# Patient Record
Sex: MALE | Race: WHITE | NOT HISPANIC OR LATINO | URBAN - METROPOLITAN AREA
[De-identification: names, ages, dates, MRNs, and addresses within clinical notes are randomized per-mention and may not be internally consistent; named-entity substitution may affect disease eponyms.]

---

## 2023-04-18 ENCOUNTER — PATIENT OUTREACH (OUTPATIENT)
Dept: CARE COORDINATION | Facility: CLINIC | Age: 28
End: 2023-04-18
Payer: COMMERCIAL

## 2023-04-19 LAB
ALANINE AMINOTRANSFERASE (SGPT) (U/L) IN SER/PLAS: 775 U/L (ref 10–52)
ALBUMIN (G/DL) IN SER/PLAS: 4.5 G/DL (ref 3.4–5)
ALKALINE PHOSPHATASE (U/L) IN SER/PLAS: 102 U/L (ref 33–120)
ANION GAP IN SER/PLAS: 15 MMOL/L (ref 10–20)
ASPARTATE AMINOTRANSFERASE (SGOT) (U/L) IN SER/PLAS: 904 U/L (ref 9–39)
BILIRUBIN TOTAL (MG/DL) IN SER/PLAS: 0.6 MG/DL (ref 0–1.2)
CALCIUM (MG/DL) IN SER/PLAS: 9.6 MG/DL (ref 8.6–10.6)
CARBON DIOXIDE, TOTAL (MMOL/L) IN SER/PLAS: 29 MMOL/L (ref 21–32)
CHLORIDE (MMOL/L) IN SER/PLAS: 97 MMOL/L (ref 98–107)
CREATINE KINASE (U/L) IN SER/PLAS: ABNORMAL U/L (ref 0–325)
CREATININE (MG/DL) IN SER/PLAS: 0.98 MG/DL (ref 0.5–1.3)
GFR MALE: >90 ML/MIN/1.73M2
GLUCOSE (MG/DL) IN SER/PLAS: 82 MG/DL (ref 74–99)
POTASSIUM (MMOL/L) IN SER/PLAS: 4.4 MMOL/L (ref 3.5–5.3)
PROTEIN TOTAL: 7.5 G/DL (ref 6.4–8.2)
SODIUM (MMOL/L) IN SER/PLAS: 137 MMOL/L (ref 136–145)
UREA NITROGEN (MG/DL) IN SER/PLAS: 22 MG/DL (ref 6–23)

## 2023-04-21 LAB
ALANINE AMINOTRANSFERASE (SGPT) (U/L) IN SER/PLAS: 579 U/L (ref 10–52)
ALBUMIN (G/DL) IN SER/PLAS: 4.4 G/DL (ref 3.4–5)
ALKALINE PHOSPHATASE (U/L) IN SER/PLAS: 94 U/L (ref 33–120)
ANION GAP IN SER/PLAS: 11 MMOL/L (ref 10–20)
ASPARTATE AMINOTRANSFERASE (SGOT) (U/L) IN SER/PLAS: 438 U/L (ref 9–39)
BILIRUBIN TOTAL (MG/DL) IN SER/PLAS: 0.5 MG/DL (ref 0–1.2)
CALCIUM (MG/DL) IN SER/PLAS: 9.2 MG/DL (ref 8.6–10.6)
CARBON DIOXIDE, TOTAL (MMOL/L) IN SER/PLAS: 28 MMOL/L (ref 21–32)
CHLORIDE (MMOL/L) IN SER/PLAS: 101 MMOL/L (ref 98–107)
CREATINE KINASE (U/L) IN SER/PLAS: 5303 U/L (ref 0–325)
CREATININE (MG/DL) IN SER/PLAS: 0.76 MG/DL (ref 0.5–1.3)
GFR MALE: >90 ML/MIN/1.73M2
GLUCOSE (MG/DL) IN SER/PLAS: 75 MG/DL (ref 74–99)
POTASSIUM (MMOL/L) IN SER/PLAS: 3.8 MMOL/L (ref 3.5–5.3)
PROTEIN TOTAL: 7.2 G/DL (ref 6.4–8.2)
SODIUM (MMOL/L) IN SER/PLAS: 136 MMOL/L (ref 136–145)
UREA NITROGEN (MG/DL) IN SER/PLAS: 26 MG/DL (ref 6–23)

## 2023-04-24 LAB
ALANINE AMINOTRANSFERASE (SGPT) (U/L) IN SER/PLAS: 325 U/L (ref 10–52)
ALBUMIN (G/DL) IN SER/PLAS: 4.5 G/DL (ref 3.4–5)
ALKALINE PHOSPHATASE (U/L) IN SER/PLAS: 79 U/L (ref 33–120)
ANION GAP IN SER/PLAS: 13 MMOL/L (ref 10–20)
APPEARANCE, URINE: CLEAR
ASPARTATE AMINOTRANSFERASE (SGOT) (U/L) IN SER/PLAS: 112 U/L (ref 9–39)
BILIRUBIN TOTAL (MG/DL) IN SER/PLAS: 0.4 MG/DL (ref 0–1.2)
BILIRUBIN, URINE: NEGATIVE
BLOOD, URINE: NEGATIVE
CALCIUM (MG/DL) IN SER/PLAS: 9.8 MG/DL (ref 8.6–10.6)
CARBON DIOXIDE, TOTAL (MMOL/L) IN SER/PLAS: 30 MMOL/L (ref 21–32)
CHLORIDE (MMOL/L) IN SER/PLAS: 101 MMOL/L (ref 98–107)
COLOR, URINE: NORMAL
CREATINE KINASE (U/L) IN SER/PLAS: 1026 U/L (ref 0–325)
CREATININE (MG/DL) IN SER/PLAS: 1.25 MG/DL (ref 0.5–1.3)
GFR MALE: 82 ML/MIN/1.73M2
GLUCOSE (MG/DL) IN SER/PLAS: 83 MG/DL (ref 74–99)
GLUCOSE, URINE: NEGATIVE MG/DL
KETONES, URINE: NEGATIVE MG/DL
LEUKOCYTE ESTERASE, URINE: NEGATIVE
NITRITE, URINE: NEGATIVE
PH, URINE: 6 (ref 5–8)
POTASSIUM (MMOL/L) IN SER/PLAS: 4.9 MMOL/L (ref 3.5–5.3)
PROTEIN TOTAL: 7.6 G/DL (ref 6.4–8.2)
PROTEIN, URINE: NEGATIVE MG/DL
SODIUM (MMOL/L) IN SER/PLAS: 139 MMOL/L (ref 136–145)
SPECIFIC GRAVITY, URINE: 1.01 (ref 1–1.03)
UREA NITROGEN (MG/DL) IN SER/PLAS: 29 MG/DL (ref 6–23)
UROBILINOGEN, URINE: <2 MG/DL (ref 0–1.9)

## 2023-04-26 ENCOUNTER — OFFICE VISIT (OUTPATIENT)
Dept: PRIMARY CARE | Facility: CLINIC | Age: 28
End: 2023-04-26
Payer: COMMERCIAL

## 2023-04-26 VITALS
OXYGEN SATURATION: 98 % | HEART RATE: 109 BPM | DIASTOLIC BLOOD PRESSURE: 62 MMHG | TEMPERATURE: 96.8 F | SYSTOLIC BLOOD PRESSURE: 108 MMHG | BODY MASS INDEX: 23.14 KG/M2 | HEIGHT: 68 IN | WEIGHT: 152.7 LBS

## 2023-04-26 DIAGNOSIS — M62.82 NON-TRAUMATIC RHABDOMYOLYSIS: Primary | ICD-10-CM

## 2023-04-26 PROCEDURE — 99214 OFFICE O/P EST MOD 30 MIN: CPT | Performed by: STUDENT IN AN ORGANIZED HEALTH CARE EDUCATION/TRAINING PROGRAM

## 2023-04-26 PROCEDURE — 1036F TOBACCO NON-USER: CPT | Performed by: STUDENT IN AN ORGANIZED HEALTH CARE EDUCATION/TRAINING PROGRAM

## 2023-04-26 RX ORDER — CYCLOBENZAPRINE HCL 5 MG
1 TABLET ORAL EVERY 6 HOURS PRN
COMMUNITY
Start: 2023-04-17

## 2023-04-26 ASSESSMENT — ENCOUNTER SYMPTOMS
DEPRESSION: 0
OCCASIONAL FEELINGS OF UNSTEADINESS: 0
LOSS OF SENSATION IN FEET: 0

## 2023-04-26 ASSESSMENT — PATIENT HEALTH QUESTIONNAIRE - PHQ9
1. LITTLE INTEREST OR PLEASURE IN DOING THINGS: NOT AT ALL
2. FEELING DOWN, DEPRESSED OR HOPELESS: NOT AT ALL
SUM OF ALL RESPONSES TO PHQ9 QUESTIONS 1 AND 2: 0

## 2023-04-26 NOTE — PROGRESS NOTES
"Subjective   Reason for Visit: Ramy Cottrell is an 27 y.o. male here for a office visit.          Reviewed all medications by prescribing practitioner or clinical pharmacist (such as prescriptions, OTCs, herbal therapies and supplements) and documented in the medical record.    HPI    Rhambdo: was recently admitted to [facility] for rhabmdo. Ck of 17,000 and elevated lfts. States he was working out a lot and not staying the most hydrated. State he did leg heavy day and ran 15 miles. He started gettign pains, dark urine and knee swelling he was taking to ED and was admitted and treated with IV fluids with improvement in labs,. Feeling well now. He is cleared to go back to work. I explained etiology of his illness and encourage could nutrition and hydration to prevent this in the future    Patient Care Team:  Srinath Joyner DO as PCP - General  Srinath Joyner DO as PCP - Employee ACO PCP  Renetta Rose RN as Care Manager (Case Management)     Review of Systems   All other systems reviewed and are negative.      Objective   Vitals:  Pulse 109   Temp 36 °C (96.8 °F) (Temporal)   Ht 1.727 m (5' 8\")   Wt 69.3 kg (152 lb 11.2 oz)   SpO2 98%   BMI 23.22 kg/m²       Physical Exam  Constitutional:       Appearance: Normal appearance.   HENT:      Head: Normocephalic and atraumatic.      Right Ear: Tympanic membrane and ear canal normal.      Left Ear: Tympanic membrane and ear canal normal.      Mouth/Throat:      Mouth: Mucous membranes are moist.      Pharynx: Oropharynx is clear.   Eyes:      Extraocular Movements: Extraocular movements intact.      Conjunctiva/sclera: Conjunctivae normal.      Pupils: Pupils are equal, round, and reactive to light.   Cardiovascular:      Rate and Rhythm: Normal rate and regular rhythm.      Pulses: Normal pulses.      Heart sounds: Normal heart sounds.   Pulmonary:      Effort: Pulmonary effort is normal.      Breath sounds: Normal breath sounds.   Abdominal:      General: Abdomen " is flat. Bowel sounds are normal.      Palpations: Abdomen is soft.   Musculoskeletal:         General: Normal range of motion.      Cervical back: Normal range of motion and neck supple.   Skin:     General: Skin is warm and dry.      Capillary Refill: Capillary refill takes 2 to 3 seconds.   Neurological:      General: No focal deficit present.      Mental Status: He is alert and oriented to person, place, and time. Mental status is at baseline.   Psychiatric:         Mood and Affect: Mood normal.         Behavior: Behavior normal.         Thought Content: Thought content normal.         Judgment: Judgment normal.         Assessment/Plan     1. Non-traumatic rhabdomyolysis  - hospital reviewed  - lab work reviewed  - cleared to go back to work  - advised good nutrition and ease in to long runs

## 2023-04-26 NOTE — LETTER
April 26, 2023     Patient: Ramy Cottrell   YOB: 1995   Date of Visit: 4/26/2023       To Whom It May Concern:    Ramy Cottrell was seen in my clinic on 4/26/2023 at 11:45 am. He is cleared to go back to work    If you have any questions or concerns, please don't hesitate to call.         Sincerely,         Srinath Joyner DO        CC: No Recipients

## 2023-04-26 NOTE — PROGRESS NOTES
"Subjective   Patient ID: Ramy Cottrell is a 27 y.o. male who presents for Results and Hospital Follow-up (syncope).    HPI     Review of Systems    Objective   /62 (BP Location: Right arm, Patient Position: Sitting)   Pulse 109   Temp 36 °C (96.8 °F) (Temporal)   Ht 1.727 m (5' 8\")   Wt 69.3 kg (152 lb 11.2 oz)   SpO2 98%   BMI 23.22 kg/m²     Physical Exam    Assessment/Plan          "

## 2024-04-16 ENCOUNTER — HOSPITAL ENCOUNTER (INPATIENT)
Age: 29
LOS: 6 days | Discharge: HOME | DRG: 558 | End: 2024-04-22
Attending: EMERGENCY MEDICINE | Admitting: FAMILY MEDICINE

## 2024-04-16 DIAGNOSIS — R74.01 TRANSAMINITIS: ICD-10-CM

## 2024-04-16 DIAGNOSIS — M62.82 NON-TRAUMATIC RHABDOMYOLYSIS: Primary | ICD-10-CM

## 2024-04-16 LAB
ALBUMIN SERPL-MCNC: 4.6 G/DL (ref 3.5–5.2)
ALBUMIN/GLOB SERPL: 1.6 {RATIO} (ref 1–2.5)
ALP SERPL-CCNC: 73 U/L (ref 40–129)
ALT SERPL-CCNC: 642 U/L (ref 5–41)
ANION GAP SERPL CALCULATED.3IONS-SCNC: 12 MMOL/L (ref 9–17)
APAP SERPL-MCNC: <10 UG/ML (ref 10–30)
APAP SERPL-MCNC: <5 UG/ML (ref 10–30)
AST SERPL-CCNC: 1916 U/L
BACTERIA URNS QL MICRO: ABNORMAL
BASOPHILS # BLD: 0 K/UL (ref 0–0.2)
BASOPHILS NFR BLD: 0 % (ref 0–2)
BILIRUB SERPL-MCNC: 0.3 MG/DL (ref 0.3–1.2)
BILIRUB UR QL STRIP: NEGATIVE
BUN SERPL-MCNC: 25 MG/DL (ref 6–20)
CALCIUM SERPL-MCNC: 9.6 MG/DL (ref 8.6–10.4)
CHARACTER UR: ABNORMAL
CHLORIDE SERPL-SCNC: 102 MMOL/L (ref 98–107)
CK SERPL-CCNC: ABNORMAL U/L (ref 39–308)
CLARITY UR: CLEAR
CO2 SERPL-SCNC: 26 MMOL/L (ref 20–31)
COLOR UR: ABNORMAL
CREAT SERPL-MCNC: 0.7 MG/DL (ref 0.7–1.2)
EOSINOPHIL # BLD: 0.1 K/UL (ref 0–0.4)
EOSINOPHILS RELATIVE PERCENT: 2 % (ref 1–4)
EPI CELLS #/AREA URNS HPF: ABNORMAL /HPF (ref 0–5)
ERYTHROCYTE [DISTWIDTH] IN BLOOD BY AUTOMATED COUNT: 13.1 % (ref 12.5–15.4)
GFR SERPL CREATININE-BSD FRML MDRD: >90 ML/MIN/1.73M2
GLUCOSE SERPL-MCNC: 123 MG/DL (ref 70–99)
GLUCOSE UR STRIP-MCNC: NEGATIVE MG/DL
HCT VFR BLD AUTO: 40.9 % (ref 41–53)
HGB BLD-MCNC: 13.9 G/DL (ref 13.5–17.5)
HGB UR QL STRIP.AUTO: ABNORMAL
INR PPP: 0.9
KETONES UR STRIP-MCNC: NEGATIVE MG/DL
LEUKOCYTE ESTERASE UR QL STRIP: NEGATIVE
LYMPHOCYTES NFR BLD: 1.3 K/UL (ref 1–4.8)
LYMPHOCYTES RELATIVE PERCENT: 14 % (ref 24–44)
MCH RBC QN AUTO: 28.5 PG (ref 26–34)
MCHC RBC AUTO-ENTMCNC: 34 G/DL (ref 31–37)
MCV RBC AUTO: 83.9 FL (ref 80–100)
MONOCYTES NFR BLD: 0.5 K/UL (ref 0.1–1.2)
MONOCYTES NFR BLD: 5 % (ref 2–11)
NEUTROPHILS NFR BLD: 79 % (ref 36–66)
NEUTS SEG NFR BLD: 7.3 K/UL (ref 1.8–7.7)
NITRITE UR QL STRIP: NEGATIVE
PARTIAL THROMBOPLASTIN TIME: 22.6 SEC (ref 21.3–31.3)
PH UR STRIP: 6.5 [PH] (ref 5–8)
PLATELET # BLD AUTO: 230 K/UL (ref 140–450)
PMV BLD AUTO: 7.7 FL (ref 6–12)
POTASSIUM SERPL-SCNC: 4.3 MMOL/L (ref 3.7–5.3)
PROT SERPL-MCNC: 7.4 G/DL (ref 6.4–8.3)
PROT UR STRIP-MCNC: ABNORMAL MG/DL
PROTHROMBIN TIME: 9.2 SEC (ref 9.4–12.6)
RBC # BLD AUTO: 4.87 M/UL (ref 4.5–5.9)
RBC #/AREA URNS HPF: ABNORMAL /HPF (ref 0–2)
SODIUM SERPL-SCNC: 140 MMOL/L (ref 135–144)
SP GR UR STRIP: 1.02 (ref 1–1.03)
UROBILINOGEN UR STRIP-ACNC: NORMAL EU/DL (ref 0–1)
WBC #/AREA URNS HPF: ABNORMAL /HPF (ref 0–5)
WBC OTHER # BLD: 9.2 K/UL (ref 3.5–11)

## 2024-04-16 PROCEDURE — 82550 ASSAY OF CK (CPK): CPT

## 2024-04-16 PROCEDURE — 81001 URINALYSIS AUTO W/SCOPE: CPT

## 2024-04-16 PROCEDURE — 86038 ANTINUCLEAR ANTIBODIES: CPT

## 2024-04-16 PROCEDURE — 99285 EMERGENCY DEPT VISIT HI MDM: CPT

## 2024-04-16 PROCEDURE — 2580000003 HC RX 258

## 2024-04-16 PROCEDURE — 80143 DRUG ASSAY ACETAMINOPHEN: CPT

## 2024-04-16 PROCEDURE — 85730 THROMBOPLASTIN TIME PARTIAL: CPT

## 2024-04-16 PROCEDURE — 86225 DNA ANTIBODY NATIVE: CPT

## 2024-04-16 PROCEDURE — 96361 HYDRATE IV INFUSION ADD-ON: CPT

## 2024-04-16 PROCEDURE — 85025 COMPLETE CBC W/AUTO DIFF WBC: CPT

## 2024-04-16 PROCEDURE — 80053 COMPREHEN METABOLIC PANEL: CPT

## 2024-04-16 PROCEDURE — 99223 1ST HOSP IP/OBS HIGH 75: CPT | Performed by: STUDENT IN AN ORGANIZED HEALTH CARE EDUCATION/TRAINING PROGRAM

## 2024-04-16 PROCEDURE — 6370000000 HC RX 637 (ALT 250 FOR IP): Performed by: EMERGENCY MEDICINE

## 2024-04-16 PROCEDURE — 96360 HYDRATION IV INFUSION INIT: CPT

## 2024-04-16 PROCEDURE — 36415 COLL VENOUS BLD VENIPUNCTURE: CPT

## 2024-04-16 PROCEDURE — 85610 PROTHROMBIN TIME: CPT

## 2024-04-16 PROCEDURE — 2580000003 HC RX 258: Performed by: REGISTERED NURSE

## 2024-04-16 PROCEDURE — 1200000000 HC SEMI PRIVATE

## 2024-04-16 RX ORDER — ENOXAPARIN SODIUM 100 MG/ML
40 INJECTION SUBCUTANEOUS DAILY
Status: DISCONTINUED | OUTPATIENT
Start: 2024-04-17 | End: 2024-04-20

## 2024-04-16 RX ORDER — ONDANSETRON 4 MG/1
4 TABLET, ORALLY DISINTEGRATING ORAL EVERY 8 HOURS PRN
Status: DISCONTINUED | OUTPATIENT
Start: 2024-04-16 | End: 2024-04-22 | Stop reason: HOSPADM

## 2024-04-16 RX ORDER — ACETAMINOPHEN 325 MG/1
650 TABLET ORAL EVERY 6 HOURS PRN
Status: DISCONTINUED | OUTPATIENT
Start: 2024-04-16 | End: 2024-04-17

## 2024-04-16 RX ORDER — SODIUM CHLORIDE 0.9 % (FLUSH) 0.9 %
10 SYRINGE (ML) INJECTION PRN
Status: DISCONTINUED | OUTPATIENT
Start: 2024-04-16 | End: 2024-04-22 | Stop reason: HOSPADM

## 2024-04-16 RX ORDER — COVID-19 ANTIGEN TEST
220 KIT MISCELLANEOUS AS NEEDED
Status: ON HOLD | COMMUNITY
End: 2024-04-22 | Stop reason: HOSPADM

## 2024-04-16 RX ORDER — POTASSIUM CHLORIDE 7.45 MG/ML
10 INJECTION INTRAVENOUS PRN
Status: DISCONTINUED | OUTPATIENT
Start: 2024-04-16 | End: 2024-04-22 | Stop reason: HOSPADM

## 2024-04-16 RX ORDER — CYCLOBENZAPRINE HCL 10 MG
10 TABLET ORAL ONCE
Status: COMPLETED | OUTPATIENT
Start: 2024-04-16 | End: 2024-04-16

## 2024-04-16 RX ORDER — SODIUM CHLORIDE 9 MG/ML
INJECTION, SOLUTION INTRAVENOUS CONTINUOUS
Status: DISCONTINUED | OUTPATIENT
Start: 2024-04-16 | End: 2024-04-22 | Stop reason: HOSPADM

## 2024-04-16 RX ORDER — ACETAMINOPHEN 650 MG/1
650 SUPPOSITORY RECTAL EVERY 6 HOURS PRN
Status: DISCONTINUED | OUTPATIENT
Start: 2024-04-16 | End: 2024-04-17

## 2024-04-16 RX ORDER — SODIUM CHLORIDE 9 MG/ML
INJECTION, SOLUTION INTRAVENOUS PRN
Status: DISCONTINUED | OUTPATIENT
Start: 2024-04-16 | End: 2024-04-22 | Stop reason: HOSPADM

## 2024-04-16 RX ORDER — ONDANSETRON 2 MG/ML
4 INJECTION INTRAMUSCULAR; INTRAVENOUS EVERY 6 HOURS PRN
Status: DISCONTINUED | OUTPATIENT
Start: 2024-04-16 | End: 2024-04-22 | Stop reason: HOSPADM

## 2024-04-16 RX ORDER — SODIUM CHLORIDE 0.9 % (FLUSH) 0.9 %
5-40 SYRINGE (ML) INJECTION EVERY 12 HOURS SCHEDULED
Status: DISCONTINUED | OUTPATIENT
Start: 2024-04-16 | End: 2024-04-22 | Stop reason: HOSPADM

## 2024-04-16 RX ORDER — POTASSIUM CHLORIDE 20 MEQ/1
40 TABLET, EXTENDED RELEASE ORAL PRN
Status: DISCONTINUED | OUTPATIENT
Start: 2024-04-16 | End: 2024-04-22 | Stop reason: HOSPADM

## 2024-04-16 RX ORDER — MAGNESIUM SULFATE 1 G/100ML
1000 INJECTION INTRAVENOUS PRN
Status: DISCONTINUED | OUTPATIENT
Start: 2024-04-16 | End: 2024-04-22 | Stop reason: HOSPADM

## 2024-04-16 RX ORDER — 0.9 % SODIUM CHLORIDE 0.9 %
1000 INTRAVENOUS SOLUTION INTRAVENOUS ONCE
Status: COMPLETED | OUTPATIENT
Start: 2024-04-16 | End: 2024-04-16

## 2024-04-16 RX ORDER — POLYETHYLENE GLYCOL 3350 17 G/17G
17 POWDER, FOR SOLUTION ORAL DAILY PRN
Status: DISCONTINUED | OUTPATIENT
Start: 2024-04-16 | End: 2024-04-22 | Stop reason: HOSPADM

## 2024-04-16 RX ADMIN — SODIUM CHLORIDE: 9 INJECTION, SOLUTION INTRAVENOUS at 21:29

## 2024-04-16 RX ADMIN — SODIUM CHLORIDE 1000 ML: 9 INJECTION, SOLUTION INTRAVENOUS at 15:44

## 2024-04-16 RX ADMIN — SODIUM CHLORIDE: 9 INJECTION, SOLUTION INTRAVENOUS at 16:31

## 2024-04-16 RX ADMIN — SODIUM CHLORIDE 1000 ML: 9 INJECTION, SOLUTION INTRAVENOUS at 14:42

## 2024-04-16 RX ADMIN — CYCLOBENZAPRINE 10 MG: 10 TABLET, FILM COATED ORAL at 19:35

## 2024-04-16 ASSESSMENT — PAIN DESCRIPTION - LOCATION
LOCATION: LEG
LOCATION: BACK;LEG;BUTTOCKS
LOCATION: GENERALIZED

## 2024-04-16 ASSESSMENT — PAIN SCALES - GENERAL
PAINLEVEL_OUTOF10: 3
PAINLEVEL_OUTOF10: 6
PAINLEVEL_OUTOF10: 8
PAINLEVEL_OUTOF10: 0

## 2024-04-16 ASSESSMENT — PAIN - FUNCTIONAL ASSESSMENT
PAIN_FUNCTIONAL_ASSESSMENT: 0-10
PAIN_FUNCTIONAL_ASSESSMENT: 0-10
PAIN_FUNCTIONAL_ASSESSMENT: ACTIVITIES ARE NOT PREVENTED

## 2024-04-16 ASSESSMENT — PAIN DESCRIPTION - DESCRIPTORS
DESCRIPTORS: ACHING
DESCRIPTORS: ACHING

## 2024-04-16 ASSESSMENT — PAIN DESCRIPTION - PAIN TYPE: TYPE: ACUTE PAIN

## 2024-04-16 ASSESSMENT — PAIN DESCRIPTION - ORIENTATION: ORIENTATION: LEFT;RIGHT

## 2024-04-16 ASSESSMENT — PAIN DESCRIPTION - ONSET: ONSET: ON-GOING

## 2024-04-16 ASSESSMENT — PAIN DESCRIPTION - FREQUENCY: FREQUENCY: CONTINUOUS

## 2024-04-16 NOTE — ED PROVIDER NOTES
[facility]  [address] [facility] [address]  Phone: [phone]      Attending Physician Attestation    I performed a history and physical examination of the patient and discussed management with the mid level provider. I reviewed the mid level provider's note and agree with the documented findings and plan of care. Any areas of disagreement are noted on the chart. I was personally present for the key portions of any procedures. I have documented in the chart those procedures where I was not present during the key portions. I have reviewed the emergency nurses triage note. I agree with the chief complaint, past medical history, past surgical history, allergies, medications, social and family history as documented unless otherwise noted below. Documentation of the HPI, Physical Exam and Medical Decision Making performed by mid level providers is based on my personal performance of the HPI, PE and MDM. For Physician Assistant/ Nurse Practitioner cases/documentation I have personally evaluated this patient and have completed at least one if not all key elements of the E/M (history, physical exam, and MDM). Additional findings are as noted.      CHIEF COMPLAINT       Chief Complaint   Patient presents with    Hematuria        PAST MEDICAL HISTORY     Past Medical History:   Diagnosis Date    Rhabdomyolysis        SURGICAL HISTORY      has no past surgical history on file.    CURRENT MEDICATIONS       Previous Medications    No medications on file       ALLERGIES     Patient has no known allergies.    FAMILY HISTORY       No family status information on file.      History reviewed. No pertinent family history.    SOCIAL HISTORY      reports that he has never smoked. He has never used smokeless tobacco. He reports that he does not currently use alcohol. He reports that he does not use drugs.     VITALS   INITIAL VITALS: BP (!) 152/90   Pulse 93   Temp 98.8 °F (37.1 °C) (Oral)   Resp 18   Ht 1.727

## 2024-04-16 NOTE — ED PROVIDER NOTES
[facility]  [address] [facility] [address]  Phone: [phone]        Pt Name: Benjamin Truong  MRN: [id number]  Birthdate 1995  Date of evaluation: 4/16/24    CHIEFCOMPLAINT       Chief Complaint   Patient presents with    Hematuria       HISTORY OF PRESENT ILLNESS (Location/Symptom, Timing/Onset, Context/Setting, Quality, Duration, Modifying Factors, Severity)      Benjamin Truong is a 28 y.o. male who presents to the ED via private auto with pink-colored urine that started today.  Patient states he has a history of rhabdomyolysis in the past, is concerned that he is in rhabdo again today.  He states he does do ultramarathons, states that he did a long hike last week and has not been able to exercise since.  Does report some muscle stiffness and pain admits that he does not drink enough water as he should.  He denies any fevers, chills, chest pain, shortness of breath or difficulty breathing.  He denies any nausea vomiting or diarrhea.  On arrival he is resting on the cot with even unlabored breaths nontoxic.  No acute distress noted.    PAST MEDICAL / SURGICAL / SOCIAL / FAMILY HISTORY     PMH:  has a past medical history of Rhabdomyolysis.  Surgical History:  has no past surgical history on file.  Social History:  reports that he has never smoked. He has never used smokeless tobacco. He reports that he does not currently use alcohol. He reports that he does not use drugs.  Family History: has no family status information on file.    family history is not on file.  Psychiatric History: None    Allergies: Patient has no known allergies.    Home Medications:   Prior to Admission medications    Not on File       REVIEW OF SYSTEMS  (2-9 systems for level 4, 10 ormore for level 5)      Review of Systems   Constitutional:  Negative for chills and fever.   Respiratory:  Negative for cough and shortness of breath.    Cardiovascular:  Negative for chest pain.   Gastrointestinal:  Negative for abdominal

## 2024-04-16 NOTE — ED NOTES
Pt presents to ED c/o hematuria and bilateral leg pain. Pt reports a hx of rhabdomyolysis and states he thinks he has it again. Pt states he trains very hard with running and lifting and thinks he over-did it recently. Pt arrives A/Ox4, PWD, PMS intact. Pt resting on stretcher with call light in reach.

## 2024-04-17 ENCOUNTER — APPOINTMENT (OUTPATIENT)
Dept: ULTRASOUND IMAGING | Age: 29
DRG: 558 | End: 2024-04-17

## 2024-04-17 PROBLEM — R74.01 TRANSAMINITIS: Status: ACTIVE | Noted: 2024-04-17

## 2024-04-17 LAB
ALBUMIN SERPL-MCNC: 3.5 G/DL (ref 3.5–5.2)
ALP SERPL-CCNC: 62 U/L (ref 40–129)
ALT SERPL-CCNC: 847 U/L (ref 5–41)
ANA SER QL IA: NEGATIVE
ANION GAP SERPL CALCULATED.3IONS-SCNC: 6 MMOL/L (ref 9–17)
AST SERPL-CCNC: 2749 U/L
BASOPHILS # BLD: 0.05 K/UL (ref 0–0.2)
BASOPHILS NFR BLD: 1 % (ref 0–2)
BILIRUB SERPL-MCNC: 0.1 MG/DL (ref 0.3–1.2)
BUN SERPL-MCNC: 17 MG/DL (ref 6–20)
BUN/CREAT SERPL: 24 (ref 9–20)
CALCIUM SERPL-MCNC: 8.3 MG/DL (ref 8.6–10.4)
CHLORIDE SERPL-SCNC: 107 MMOL/L (ref 98–107)
CK SERPL-CCNC: ABNORMAL U/L (ref 39–308)
CO2 SERPL-SCNC: 28 MMOL/L (ref 20–31)
CREAT SERPL-MCNC: 0.7 MG/DL (ref 0.7–1.2)
DSDNA IGG SER QL IA: 1.7 IU/ML
EOSINOPHIL # BLD: 0.33 K/UL (ref 0–0.44)
EOSINOPHILS RELATIVE PERCENT: 5 % (ref 1–4)
ERYTHROCYTE [DISTWIDTH] IN BLOOD BY AUTOMATED COUNT: 12.6 % (ref 11.8–14.4)
GFR SERPL CREATININE-BSD FRML MDRD: >90 ML/MIN/1.73M2
GLUCOSE SERPL-MCNC: 105 MG/DL (ref 70–99)
HAV IGM SERPL QL IA: NONREACTIVE
HBV CORE IGM SERPL QL IA: NONREACTIVE
HBV SURFACE AG SERPL QL IA: NONREACTIVE
HCT VFR BLD AUTO: 40 % (ref 40.7–50.3)
HCV AB SERPL QL IA: NONREACTIVE
HGB BLD-MCNC: 12.5 G/DL (ref 13–17)
IMM GRANULOCYTES # BLD AUTO: 0.02 K/UL (ref 0–0.3)
IMM GRANULOCYTES NFR BLD: 0 %
LIPASE SERPL-CCNC: 38 U/L (ref 13–60)
LYMPHOCYTES NFR BLD: 2.3 K/UL (ref 1.1–3.7)
LYMPHOCYTES RELATIVE PERCENT: 33 % (ref 24–43)
MCH RBC QN AUTO: 28 PG (ref 25.2–33.5)
MCHC RBC AUTO-ENTMCNC: 31.3 G/DL (ref 28.4–34.8)
MCV RBC AUTO: 89.5 FL (ref 82.6–102.9)
MONOCYTES NFR BLD: 0.42 K/UL (ref 0.1–1.2)
MONOCYTES NFR BLD: 6 % (ref 3–12)
MYOGLOBIN SERPL-MCNC: 1632 NG/ML (ref 28–72)
MYOGLOBIN SERPL-MCNC: 1929 NG/ML (ref 28–72)
MYOGLOBIN SERPL-MCNC: 2416 NG/ML (ref 28–72)
NEUTROPHILS NFR BLD: 56 % (ref 36–65)
NEUTS SEG NFR BLD: 3.92 K/UL (ref 1.5–8.1)
NRBC BLD-RTO: 0 PER 100 WBC
NUCLEAR IGG SER IA-RTO: 0.2 U/ML
PLATELET # BLD AUTO: 194 K/UL (ref 138–453)
PMV BLD AUTO: 9.4 FL (ref 8.1–13.5)
POTASSIUM SERPL-SCNC: 4.3 MMOL/L (ref 3.7–5.3)
PROT SERPL-MCNC: 5.8 G/DL (ref 6.4–8.3)
RBC # BLD AUTO: 4.47 M/UL (ref 4.21–5.77)
SODIUM SERPL-SCNC: 141 MMOL/L (ref 135–144)
WBC OTHER # BLD: 7 K/UL (ref 3.5–11.3)

## 2024-04-17 PROCEDURE — 82550 ASSAY OF CK (CPK): CPT

## 2024-04-17 PROCEDURE — 80074 ACUTE HEPATITIS PANEL: CPT

## 2024-04-17 PROCEDURE — 83874 ASSAY OF MYOGLOBIN: CPT

## 2024-04-17 PROCEDURE — 85025 COMPLETE CBC W/AUTO DIFF WBC: CPT

## 2024-04-17 PROCEDURE — 80053 COMPREHEN METABOLIC PANEL: CPT

## 2024-04-17 PROCEDURE — 6370000000 HC RX 637 (ALT 250 FOR IP): Performed by: NURSE PRACTITIONER

## 2024-04-17 PROCEDURE — 99231 SBSQ HOSP IP/OBS SF/LOW 25: CPT | Performed by: NURSE PRACTITIONER

## 2024-04-17 PROCEDURE — 6360000002 HC RX W HCPCS

## 2024-04-17 PROCEDURE — 36415 COLL VENOUS BLD VENIPUNCTURE: CPT

## 2024-04-17 PROCEDURE — 2580000003 HC RX 258

## 2024-04-17 PROCEDURE — 83690 ASSAY OF LIPASE: CPT

## 2024-04-17 PROCEDURE — 1200000000 HC SEMI PRIVATE

## 2024-04-17 PROCEDURE — 76705 ECHO EXAM OF ABDOMEN: CPT

## 2024-04-17 PROCEDURE — 2580000003 HC RX 258: Performed by: NURSE PRACTITIONER

## 2024-04-17 RX ORDER — CYCLOBENZAPRINE HCL 10 MG
10 TABLET ORAL ONCE
Status: COMPLETED | OUTPATIENT
Start: 2024-04-17 | End: 2024-04-17

## 2024-04-17 RX ORDER — CYCLOBENZAPRINE HCL 5 MG
5 TABLET ORAL 3 TIMES DAILY PRN
Status: DISCONTINUED | OUTPATIENT
Start: 2024-04-17 | End: 2024-04-19

## 2024-04-17 RX ORDER — 0.9 % SODIUM CHLORIDE 0.9 %
1000 INTRAVENOUS SOLUTION INTRAVENOUS ONCE
Status: COMPLETED | OUTPATIENT
Start: 2024-04-17 | End: 2024-04-17

## 2024-04-17 RX ADMIN — CYCLOBENZAPRINE HYDROCHLORIDE 10 MG: 10 TABLET, FILM COATED ORAL at 03:46

## 2024-04-17 RX ADMIN — CYCLOBENZAPRINE HYDROCHLORIDE 5 MG: 5 TABLET, FILM COATED ORAL at 11:49

## 2024-04-17 RX ADMIN — CYCLOBENZAPRINE HYDROCHLORIDE 5 MG: 5 TABLET, FILM COATED ORAL at 19:46

## 2024-04-17 RX ADMIN — ENOXAPARIN SODIUM 40 MG: 100 INJECTION SUBCUTANEOUS at 08:18

## 2024-04-17 RX ADMIN — SODIUM CHLORIDE: 9 INJECTION, SOLUTION INTRAVENOUS at 05:24

## 2024-04-17 RX ADMIN — SODIUM CHLORIDE: 9 INJECTION, SOLUTION INTRAVENOUS at 15:00

## 2024-04-17 RX ADMIN — SODIUM CHLORIDE 1000 ML: 9 INJECTION, SOLUTION INTRAVENOUS at 09:00

## 2024-04-17 RX ADMIN — SODIUM CHLORIDE: 9 INJECTION, SOLUTION INTRAVENOUS at 11:09

## 2024-04-17 RX ADMIN — SODIUM CHLORIDE: 9 INJECTION, SOLUTION INTRAVENOUS at 01:33

## 2024-04-17 ASSESSMENT — PAIN DESCRIPTION - PAIN TYPE: TYPE: ACUTE PAIN

## 2024-04-17 ASSESSMENT — PAIN - FUNCTIONAL ASSESSMENT
PAIN_FUNCTIONAL_ASSESSMENT: ACTIVITIES ARE NOT PREVENTED

## 2024-04-17 ASSESSMENT — PAIN SCALES - GENERAL
PAINLEVEL_OUTOF10: 2
PAINLEVEL_OUTOF10: 3
PAINLEVEL_OUTOF10: 7
PAINLEVEL_OUTOF10: 2
PAINLEVEL_OUTOF10: 0
PAINLEVEL_OUTOF10: 0
PAINLEVEL_OUTOF10: 3
PAINLEVEL_OUTOF10: 3

## 2024-04-17 ASSESSMENT — PAIN DESCRIPTION - LOCATION
LOCATION: LEG;BUTTOCKS
LOCATION: LEG
LOCATION: LEG
LOCATION: BUTTOCKS

## 2024-04-17 ASSESSMENT — PAIN DESCRIPTION - DESCRIPTORS
DESCRIPTORS: ACHING;TIGHTNESS
DESCRIPTORS: CRAMPING;SQUEEZING
DESCRIPTORS: ACHING
DESCRIPTORS: ACHING;CRAMPING

## 2024-04-17 ASSESSMENT — PAIN DESCRIPTION - ORIENTATION
ORIENTATION: RIGHT;LEFT
ORIENTATION: RIGHT;LEFT
ORIENTATION: LEFT;RIGHT

## 2024-04-17 ASSESSMENT — PAIN DESCRIPTION - FREQUENCY: FREQUENCY: CONTINUOUS

## 2024-04-17 ASSESSMENT — PAIN DESCRIPTION - ONSET: ONSET: ON-GOING

## 2024-04-17 NOTE — CARE COORDINATION
Case Management Assessment  Initial Evaluation    Date/Time of Evaluation: 4/17/2024 8:33 AM  Assessment Completed by: JAMEEL ARITA RN    If patient is discharged prior to next notation, then this note serves as note for discharge by case management.    Patient Name: Benjamin Truong                   YOB: 1995  Diagnosis: Non-traumatic rhabdomyolysis [M62.82]  Rhabdomyolysis [M62.82]                   Date / Time: 4/16/2024  1:55 PM    Patient Admission Status: Inpatient   Readmission Risk (Low < 19, Mod (19-27), High > 27): Readmission Risk Score: 4.2    Current PCP: No primary care provider on file.  PCP verified by CM? No (has none will need list prior to dc)    Chart Reviewed: Yes      History Provided by: Patient  Patient Orientation: Alert and Oriented    Patient Cognition: Alert    Hospitalization in the last 30 days (Readmission):  No    If yes, Readmission Assessment in CM Navigator will be completed.    Advance Directives:      Code Status: Full Code   Patient's Primary Decision Maker is: Legal Next of Kin      Discharge Planning:    Patient lives with: Spouse/Significant Other Type of Home: Apartment (APT [address]5)  Primary Care Giver: Self  Patient Support Systems include: Spouse/Significant Other   Current Financial resources: None, HELP  Current community resources: None  Current services prior to admission: None            Current DME:              Type of Home Care services:  None    ADLS  Prior functional level: Independent in ADLs/IADLs  Current functional level: Independent in ADLs/IADLs    PT AM-PAC:   /24  OT AM-PAC:   /24    Family can provide assistance at DC: Yes  Would you like Case Management to discuss the discharge plan with any other family members/significant others, and if so, who? No  Plans to Return to Present Housing: Yes  Other Identified Issues/Barriers to RETURNING to current housing: none   Potential Assistance needed at discharge: N/A            Potential DME:

## 2024-04-17 NOTE — PLAN OF CARE
Problem: Discharge Planning  Goal: Discharge to home or other facility with appropriate resources  Outcome: Progressing  Flowsheets (Taken 4/16/2024 2105)  Discharge to home or other facility with appropriate resources:   Identify barriers to discharge with patient and caregiver   Arrange for needed discharge resources and transportation as appropriate   Identify discharge learning needs (meds, wound care, etc)   Refer to discharge planning if patient needs post-hospital services based on physician order or complex needs related to functional status, cognitive ability or social support system     Problem: Pain  Goal: Verbalizes/displays adequate comfort level or baseline comfort level  Outcome: Progressing  Flowsheets (Taken 4/16/2024 2212)  Verbalizes/displays adequate comfort level or baseline comfort level:   Encourage patient to monitor pain and request assistance   Assess pain using appropriate pain scale   Administer analgesics based on type and severity of pain and evaluate response

## 2024-04-17 NOTE — H&P
[facility]  Office: [phone]  Harish Palmer DO, Jostin Crespo DO, Salvador Prescott DO, Ricardo Vasquez DO, Emi Reese MD, Susana Moreira MD, Ian Scott MD, Marie Verdugo MD,  Sylvain Cronin MD, Hawa Rizo MD, Elbert Thomas MD,  Brigid Tarango DO, Rachan Haro MD, Yash Power MD, Levar Palmer DO, Purvi Monsalve MD,  Eric Villarreal DO, Romana Charles MD, Keara Hall MD, Ana Paula Whittington MD, Ankita Leung MD,  Gerardo Maciel MD, Jessa Witt MD, Eric Troncoso MD, Warren Hopson MD, Doug Garrison MD, Joycelyn Cyr MD, Mark Martinez DO, Sony Garcia DO, Petra Jensen MD,  Jesus Lane MD, Shirley Waterhouse, CNP,  Mariana Arias CNP, Shaquille Marin, CNP,  Jessenia Calderon, DNP, Salma Dueñas, CNP, Bharti Russell, CNP, Stacie Coleman, CNP, Lizbeth Elizabeth, CNP, Renetta Cornejo, PA-C, Rafaela Trevino PA-C, Saray Mon, CNP, Janessa Daigle, CNP, Mercedes Meyer, CNP, Carmen Gutierrez, CNP, Carrie Dent, CNP, Ashly Pelaez, CNS, Danielle Stewart, CNP, Roxana Cabrera CNP, Tracy Schwab, CNP         [facility]   IN-PATIENT SERVICE   [facility]    HISTORY AND PHYSICAL EXAMINATION            Date:   4/16/2024  Patient name:  Benjamin Truong  Date of admission:  4/16/2024  1:55 PM  MRN:   [id number]  Account:  [id number]  YOB: 1995  PCP:    No primary care provider on file.  Room:   2016/2016-02  Code Status:    Full Code    Chief Complaint:     Chief Complaint   Patient presents with    Hematuria       History Obtained From:     patient    History of Present Illness:     Benjamin Truong is a 28 y.o. Non- / non  male who presents with Hematuria   and is admitted to the hospital for the management of Rhabdomyolysis.    28-year-old with a past medical history of rhabdomyolysis presents emergency department for hematuria.  Patient does admit to having a history of rhabdomyolysis after strenuous exercise.  Today he presents from

## 2024-04-17 NOTE — PLAN OF CARE
Problem: Discharge Planning  Goal: Discharge to home or other facility with appropriate resources  Outcome: Progressing  Flowsheets (Taken 4/17/2024 0930)  Discharge to home or other facility with appropriate resources:   Identify barriers to discharge with patient and caregiver   Arrange for needed discharge resources and transportation as appropriate   Identify discharge learning needs (meds, wound care, etc)   Refer to discharge planning if patient needs post-hospital services based on physician order or complex needs related to functional status, cognitive ability or social support system     Problem: Pain  Goal: Verbalizes/displays adequate comfort level or baseline comfort level  Outcome: Progressing  Flowsheets (Taken 4/16/2024 2212 by Mary Crane, RN)  Verbalizes/displays adequate comfort level or baseline comfort level:   Encourage patient to monitor pain and request assistance   Assess pain using appropriate pain scale   Administer analgesics based on type and severity of pain and evaluate response     Problem: Musculoskeletal - Adult  Goal: Return mobility to safest level of function  Outcome: Progressing  Flowsheets (Taken 4/17/2024 0930)  Return Mobility to Safest Level of Function:   Assess patient stability and activity tolerance for standing, transferring and ambulating with or without assistive devices   Assist with transfers and ambulation using safe patient handling equipment as needed   Ensure adequate protection for wounds/incisions during mobilization   Obtain physical therapy/occupational therapy consults as needed   Instruct patient/family in ordered activity level     Problem: Genitourinary - Adult  Goal: Absence of urinary retention  Outcome: Progressing  Flowsheets (Taken 4/17/2024 0930)  Absence of urinary retention:   Assess patient’s ability to void and empty bladder   Monitor intake/output and perform bladder scan as needed

## 2024-04-18 LAB
ALBUMIN SERPL-MCNC: 3.6 G/DL (ref 3.5–5.2)
ALP SERPL-CCNC: 58 U/L (ref 40–129)
ALT SERPL-CCNC: 977 U/L (ref 5–41)
ANION GAP SERPL CALCULATED.3IONS-SCNC: 4 MMOL/L (ref 9–17)
AST SERPL-CCNC: 2605 U/L
BASOPHILS # BLD: 0.05 K/UL (ref 0–0.2)
BASOPHILS NFR BLD: 1 % (ref 0–2)
BILIRUB DIRECT SERPL-MCNC: 0.1 MG/DL
BILIRUB INDIRECT SERPL-MCNC: 0.1 MG/DL (ref 0–1)
BILIRUB SERPL-MCNC: 0.2 MG/DL (ref 0.3–1.2)
BUN SERPL-MCNC: 14 MG/DL (ref 6–20)
BUN/CREAT SERPL: 20 (ref 9–20)
CALCIUM SERPL-MCNC: 8.6 MG/DL (ref 8.6–10.4)
CHLORIDE SERPL-SCNC: 103 MMOL/L (ref 98–107)
CK SERPL-CCNC: ABNORMAL U/L (ref 39–308)
CO2 SERPL-SCNC: 30 MMOL/L (ref 20–31)
CREAT SERPL-MCNC: 0.7 MG/DL (ref 0.7–1.2)
EOSINOPHIL # BLD: 0.3 K/UL (ref 0–0.44)
EOSINOPHILS RELATIVE PERCENT: 4 % (ref 1–4)
ERYTHROCYTE [DISTWIDTH] IN BLOOD BY AUTOMATED COUNT: 12.6 % (ref 11.8–14.4)
GFR SERPL CREATININE-BSD FRML MDRD: >90 ML/MIN/1.73M2
GLUCOSE SERPL-MCNC: 87 MG/DL (ref 70–99)
HCT VFR BLD AUTO: 40.7 % (ref 40.7–50.3)
HGB BLD-MCNC: 12.5 G/DL (ref 13–17)
IMM GRANULOCYTES # BLD AUTO: 0.02 K/UL (ref 0–0.3)
IMM GRANULOCYTES NFR BLD: 0 %
LYMPHOCYTES NFR BLD: 2.04 K/UL (ref 1.1–3.7)
LYMPHOCYTES RELATIVE PERCENT: 29 % (ref 24–43)
MAGNESIUM SERPL-MCNC: 1.9 MG/DL (ref 1.6–2.6)
MCH RBC QN AUTO: 28.2 PG (ref 25.2–33.5)
MCHC RBC AUTO-ENTMCNC: 30.7 G/DL (ref 28.4–34.8)
MCV RBC AUTO: 91.7 FL (ref 82.6–102.9)
MONOCYTES NFR BLD: 0.51 K/UL (ref 0.1–1.2)
MONOCYTES NFR BLD: 7 % (ref 3–12)
MYOGLOBIN SERPL-MCNC: 1574 NG/ML (ref 28–72)
MYOGLOBIN SERPL-MCNC: 2134 NG/ML (ref 28–72)
MYOGLOBIN SERPL-MCNC: 2289 NG/ML (ref 28–72)
MYOGLOBIN SERPL-MCNC: 2858 NG/ML (ref 28–72)
NEUTROPHILS NFR BLD: 59 % (ref 36–65)
NEUTS SEG NFR BLD: 4.01 K/UL (ref 1.5–8.1)
NRBC BLD-RTO: 0 PER 100 WBC
PLATELET # BLD AUTO: 190 K/UL (ref 138–453)
PMV BLD AUTO: 9.5 FL (ref 8.1–13.5)
POTASSIUM SERPL-SCNC: 4 MMOL/L (ref 3.7–5.3)
PROT SERPL-MCNC: 6 G/DL (ref 6.4–8.3)
RBC # BLD AUTO: 4.44 M/UL (ref 4.21–5.77)
SODIUM SERPL-SCNC: 137 MMOL/L (ref 135–144)
WBC OTHER # BLD: 6.9 K/UL (ref 3.5–11.3)

## 2024-04-18 PROCEDURE — 1200000000 HC SEMI PRIVATE

## 2024-04-18 PROCEDURE — 80048 BASIC METABOLIC PNL TOTAL CA: CPT

## 2024-04-18 PROCEDURE — 36415 COLL VENOUS BLD VENIPUNCTURE: CPT

## 2024-04-18 PROCEDURE — 82550 ASSAY OF CK (CPK): CPT

## 2024-04-18 PROCEDURE — 99232 SBSQ HOSP IP/OBS MODERATE 35: CPT | Performed by: NURSE PRACTITIONER

## 2024-04-18 PROCEDURE — 83874 ASSAY OF MYOGLOBIN: CPT

## 2024-04-18 PROCEDURE — 2580000003 HC RX 258: Performed by: NURSE PRACTITIONER

## 2024-04-18 PROCEDURE — 6370000000 HC RX 637 (ALT 250 FOR IP): Performed by: NURSE PRACTITIONER

## 2024-04-18 PROCEDURE — 85025 COMPLETE CBC W/AUTO DIFF WBC: CPT

## 2024-04-18 PROCEDURE — 83735 ASSAY OF MAGNESIUM: CPT

## 2024-04-18 PROCEDURE — 6360000002 HC RX W HCPCS

## 2024-04-18 PROCEDURE — 80076 HEPATIC FUNCTION PANEL: CPT

## 2024-04-18 RX ORDER — CALCIUM CARBONATE 300MG(750)
1 TABLET,CHEWABLE ORAL 2 TIMES DAILY
Status: ON HOLD | COMMUNITY
End: 2024-04-22 | Stop reason: HOSPADM

## 2024-04-18 RX ADMIN — SODIUM CHLORIDE: 9 INJECTION, SOLUTION INTRAVENOUS at 18:30

## 2024-04-18 RX ADMIN — SODIUM CHLORIDE: 9 INJECTION, SOLUTION INTRAVENOUS at 03:45

## 2024-04-18 RX ADMIN — SODIUM CHLORIDE: 9 INJECTION, SOLUTION INTRAVENOUS at 14:57

## 2024-04-18 RX ADMIN — SODIUM CHLORIDE: 9 INJECTION, SOLUTION INTRAVENOUS at 09:58

## 2024-04-18 RX ADMIN — CYCLOBENZAPRINE HYDROCHLORIDE 5 MG: 5 TABLET, FILM COATED ORAL at 23:46

## 2024-04-18 RX ADMIN — SODIUM CHLORIDE: 9 INJECTION, SOLUTION INTRAVENOUS at 23:48

## 2024-04-18 RX ADMIN — CYCLOBENZAPRINE HYDROCHLORIDE 5 MG: 5 TABLET, FILM COATED ORAL at 03:43

## 2024-04-18 RX ADMIN — CYCLOBENZAPRINE HYDROCHLORIDE 5 MG: 5 TABLET, FILM COATED ORAL at 13:05

## 2024-04-18 RX ADMIN — ENOXAPARIN SODIUM 40 MG: 100 INJECTION SUBCUTANEOUS at 09:11

## 2024-04-18 ASSESSMENT — PAIN DESCRIPTION - DESCRIPTORS
DESCRIPTORS: TIGHTNESS
DESCRIPTORS: TIGHTNESS;ACHING

## 2024-04-18 ASSESSMENT — PAIN - FUNCTIONAL ASSESSMENT: PAIN_FUNCTIONAL_ASSESSMENT: ACTIVITIES ARE NOT PREVENTED

## 2024-04-18 ASSESSMENT — PAIN DESCRIPTION - PAIN TYPE: TYPE: ACUTE PAIN

## 2024-04-18 ASSESSMENT — PAIN SCALES - GENERAL
PAINLEVEL_OUTOF10: 5
PAINLEVEL_OUTOF10: 0
PAINLEVEL_OUTOF10: 7
PAINLEVEL_OUTOF10: 5
PAINLEVEL_OUTOF10: 5

## 2024-04-18 ASSESSMENT — PAIN DESCRIPTION - LOCATION
LOCATION: LEG
LOCATION: BUTTOCKS;LEG

## 2024-04-18 ASSESSMENT — PAIN DESCRIPTION - ORIENTATION: ORIENTATION: RIGHT

## 2024-04-18 NOTE — CARE COORDINATION
DC Planning    Spoke with pt, cont on IVF. Pt has left messages with his employer re if still eligible to get on their insurance. Advised can work out a payment plan. HELP did see. Pt is independent, lives with girlfriend. Moved recently from [place] area-pcp list given.

## 2024-04-18 NOTE — CONSULTS
GASTROENTEROLOGY CONSULT       REASON FOR CONSULT:  Elevated LFTs    REQUESTING PHYSICIAN:  Ian Scott MD    HISTORY OF PRESENT ILLNESS:    The patient is a 28 y.o. male who presents with complaints of hematuria, history of rhabdomyolysis from strenuous exercise.  Found to be in rhabdo this admission with elevated LFTs.  Unfortunately, has a history of rhabdo and is aware of the symptoms that occur for him.  Experiencing muscle weakness, otherwise feeling well.  Denies nausea, vomiting, abdominal pain, or bowel changes.  No alcohol use.          MEDICAL HISTORY:   Past Medical History:   Diagnosis Date    Rhabdomyolysis        SURGICAL HISTORY:  History reviewed. No pertinent surgical history.    MEDS:  Prior to Admission medications    Medication Sig Start Date End Date Taking? Authorizing Provider   Naproxen Sodium (ALEVE) 220 MG CAPS Take 220 mg by mouth as needed for Pain   Yes Provider, MD Wyatt     Scheduled Meds:   sodium chloride  1,000 mL IntraVENous Once    sodium chloride flush  5-40 mL IntraVENous 2 times per day    enoxaparin  40 mg SubCUTAneous Daily     Continuous Infusions:   sodium chloride 250 mL/hr at 04/17/24 0612    sodium chloride       PRN Meds:sodium chloride flush, sodium chloride, potassium chloride **OR** potassium alternative oral replacement **OR** potassium chloride, magnesium sulfate, ondansetron **OR** ondansetron, polyethylene glycol, acetaminophen **OR** acetaminophen    ALLERGIES:  No Known Allergies    SOCIAL HISTORY:    reports that he has never smoked. He has never used smokeless tobacco. He reports that he does not currently use alcohol. He reports that he does not use drugs.    FAMILY HISTORY:   History reviewed. No pertinent family history.    REVIEW OF SYSTEMS:  10 out of 14 systems otherwise negative according to patient.    PHYSICAL EXAM:    /76   Pulse 63   Temp 97.7 °F (36.5 °C) (Oral)   Resp 17   Ht 1.727 m (5' 8\")   Wt 68.9 kg (152 lb)   
  Component Value Date/Time    WBC 6.9 04/18/2024 02:18 AM    HGB 12.5 04/18/2024 02:18 AM     04/18/2024 02:18 AM     BMP:    Lab Results   Component Value Date/Time     04/18/2024 02:18 AM    K 4.0 04/18/2024 02:18 AM     04/18/2024 02:18 AM    CO2 30 04/18/2024 02:18 AM    BUN 14 04/18/2024 02:18 AM    CREATININE 0.7 04/18/2024 02:18 AM    CALCIUM 8.6 04/18/2024 02:18 AM    GLUCOSE 87 04/18/2024 02:18 AM     PT/INR:    Lab Results   Component Value Date/Time    PROTIME 9.2 04/16/2024 02:15 PM    INR 0.9 04/16/2024 02:15 PM     Troponin:  No results found for: \"TROPONINI\"    Radiology: None at this time    ASSESSMENT:Principal Problem:    Rhabdomyolysis  Active Problems:    Transaminitis  Resolved Problems:    * No resolved hospital problems. *       PLAN:  1) there is no evidence of compartment syndrome.  No surgical intervention is warranted at this time.  2) consider consultation with a primary care sports medicine Dr. Stevenson Patel at the [facility] upon discharge for further metabolic workup to help prevent further episodes.      Stevenson Turner MD

## 2024-04-18 NOTE — PLAN OF CARE
Problem: Pain  Goal: Verbalizes/displays adequate comfort level or baseline comfort level  4/18/2024 1151 by Car Morales, RN  Outcome: Progressing  Flowsheets (Taken 4/18/2024 1151)  Verbalizes/displays adequate comfort level or baseline comfort level:   Encourage patient to monitor pain and request assistance   Assess pain using appropriate pain scale   Administer analgesics based on type and severity of pain and evaluate response   Implement non-pharmacological measures as appropriate and evaluate response  Note: Patient admitted with Rhabdomyolysis. Patient c/o cramping intermittently to BLE. Patient taking Flexeril prn  4/18/2024 0226 by Mary Crane, RN  Outcome: Progressing

## 2024-04-18 NOTE — PLAN OF CARE
Problem: Discharge Planning  Goal: Discharge to home or other facility with appropriate resources  4/18/2024 0226 by Mary Crane RN  Outcome: Progressing  Flowsheets (Taken 4/17/2024 1953)  Discharge to home or other facility with appropriate resources:   Identify barriers to discharge with patient and caregiver   Arrange for needed discharge resources and transportation as appropriate   Identify discharge learning needs (meds, wound care, etc)   Refer to discharge planning if patient needs post-hospital services based on physician order or complex needs related to functional status, cognitive ability or social support system    Problem: Musculoskeletal - Adult  Goal: Return mobility to safest level of function  4/18/2024 0226 by Mary Crane RN  Outcome: Progressing  Flowsheets (Taken 4/17/2024 1953)  Return Mobility to Safest Level of Function:   Assess patient stability and activity tolerance for standing, transferring and ambulating with or without assistive devices   Assist with transfers and ambulation using safe patient handling equipment as needed   Obtain physical therapy/occupational therapy consults as needed    Problem: Genitourinary - Adult  Goal: Absence of urinary retention  4/18/2024 0226 by Mary Crane, RN  Outcome: Progressing  Flowsheets (Taken 4/17/2024 1953)  Absence of urinary retention:   Assess patient’s ability to void and empty bladder   Monitor intake/output and perform bladder scan as needed

## 2024-04-19 LAB
ALBUMIN SERPL-MCNC: 3.7 G/DL (ref 3.5–5.2)
ALP SERPL-CCNC: 54 U/L (ref 40–129)
ALT SERPL-CCNC: 1003 U/L (ref 5–41)
ANION GAP SERPL CALCULATED.3IONS-SCNC: 6 MMOL/L (ref 9–17)
AST SERPL-CCNC: 2346 U/L
BASOPHILS # BLD: 0.05 K/UL (ref 0–0.2)
BASOPHILS NFR BLD: 1 % (ref 0–2)
BILIRUB DIRECT SERPL-MCNC: <0.1 MG/DL
BILIRUB INDIRECT SERPL-MCNC: ABNORMAL MG/DL (ref 0–1)
BILIRUB SERPL-MCNC: 0.2 MG/DL (ref 0.3–1.2)
BUN SERPL-MCNC: 16 MG/DL (ref 6–20)
BUN/CREAT SERPL: 23 (ref 9–20)
CALCIUM SERPL-MCNC: 8.7 MG/DL (ref 8.6–10.4)
CHLORIDE SERPL-SCNC: 103 MMOL/L (ref 98–107)
CK SERPL-CCNC: ABNORMAL U/L (ref 39–308)
CK SERPL-CCNC: ABNORMAL U/L (ref 39–308)
CO2 SERPL-SCNC: 30 MMOL/L (ref 20–31)
CREAT SERPL-MCNC: 0.7 MG/DL (ref 0.7–1.2)
EOSINOPHIL # BLD: 0.32 K/UL (ref 0–0.44)
EOSINOPHILS RELATIVE PERCENT: 5 % (ref 1–4)
ERYTHROCYTE [DISTWIDTH] IN BLOOD BY AUTOMATED COUNT: 12.3 % (ref 11.8–14.4)
GFR SERPL CREATININE-BSD FRML MDRD: >90 ML/MIN/1.73M2
GLUCOSE SERPL-MCNC: 85 MG/DL (ref 70–99)
HCT VFR BLD AUTO: 37.9 % (ref 40.7–50.3)
HGB BLD-MCNC: 11.7 G/DL (ref 13–17)
IMM GRANULOCYTES # BLD AUTO: 0.02 K/UL (ref 0–0.3)
IMM GRANULOCYTES NFR BLD: 0 %
LYMPHOCYTES NFR BLD: 2.51 K/UL (ref 1.1–3.7)
LYMPHOCYTES RELATIVE PERCENT: 38 % (ref 24–43)
MCH RBC QN AUTO: 27.9 PG (ref 25.2–33.5)
MCHC RBC AUTO-ENTMCNC: 30.9 G/DL (ref 28.4–34.8)
MCV RBC AUTO: 90.2 FL (ref 82.6–102.9)
MONOCYTES NFR BLD: 0.47 K/UL (ref 0.1–1.2)
MONOCYTES NFR BLD: 7 % (ref 3–12)
MYOGLOBIN SERPL-MCNC: 1009 NG/ML (ref 28–72)
MYOGLOBIN SERPL-MCNC: 1316 NG/ML (ref 28–72)
MYOGLOBIN SERPL-MCNC: 1420 NG/ML (ref 28–72)
NEUTROPHILS NFR BLD: 49 % (ref 36–65)
NEUTS SEG NFR BLD: 3.33 K/UL (ref 1.5–8.1)
NRBC BLD-RTO: 0 PER 100 WBC
PLATELET # BLD AUTO: 179 K/UL (ref 138–453)
PMV BLD AUTO: 9.4 FL (ref 8.1–13.5)
POTASSIUM SERPL-SCNC: 3.7 MMOL/L (ref 3.7–5.3)
PROT SERPL-MCNC: 5.9 G/DL (ref 6.4–8.3)
RBC # BLD AUTO: 4.2 M/UL (ref 4.21–5.77)
SODIUM SERPL-SCNC: 139 MMOL/L (ref 135–144)
WBC OTHER # BLD: 6.7 K/UL (ref 3.5–11.3)

## 2024-04-19 PROCEDURE — 83874 ASSAY OF MYOGLOBIN: CPT

## 2024-04-19 PROCEDURE — 99232 SBSQ HOSP IP/OBS MODERATE 35: CPT | Performed by: NURSE PRACTITIONER

## 2024-04-19 PROCEDURE — 2580000003 HC RX 258: Performed by: NURSE PRACTITIONER

## 2024-04-19 PROCEDURE — 6370000000 HC RX 637 (ALT 250 FOR IP): Performed by: NURSE PRACTITIONER

## 2024-04-19 PROCEDURE — 82550 ASSAY OF CK (CPK): CPT

## 2024-04-19 PROCEDURE — 85025 COMPLETE CBC W/AUTO DIFF WBC: CPT

## 2024-04-19 PROCEDURE — 80048 BASIC METABOLIC PNL TOTAL CA: CPT

## 2024-04-19 PROCEDURE — 1200000000 HC SEMI PRIVATE

## 2024-04-19 PROCEDURE — 6360000002 HC RX W HCPCS

## 2024-04-19 PROCEDURE — 36415 COLL VENOUS BLD VENIPUNCTURE: CPT

## 2024-04-19 PROCEDURE — 80076 HEPATIC FUNCTION PANEL: CPT

## 2024-04-19 RX ORDER — OXYCODONE HYDROCHLORIDE 5 MG/1
5 TABLET ORAL EVERY 6 HOURS PRN
Status: DISCONTINUED | OUTPATIENT
Start: 2024-04-19 | End: 2024-04-22 | Stop reason: HOSPADM

## 2024-04-19 RX ORDER — METHOCARBAMOL 500 MG/1
500 TABLET, FILM COATED ORAL ONCE
Status: COMPLETED | OUTPATIENT
Start: 2024-04-19 | End: 2024-04-19

## 2024-04-19 RX ORDER — CYCLOBENZAPRINE HCL 10 MG
10 TABLET ORAL 3 TIMES DAILY PRN
Status: DISCONTINUED | OUTPATIENT
Start: 2024-04-19 | End: 2024-04-19

## 2024-04-19 RX ADMIN — SODIUM CHLORIDE: 9 INJECTION, SOLUTION INTRAVENOUS at 11:25

## 2024-04-19 RX ADMIN — SODIUM CHLORIDE: 9 INJECTION, SOLUTION INTRAVENOUS at 22:06

## 2024-04-19 RX ADMIN — ENOXAPARIN SODIUM 40 MG: 100 INJECTION SUBCUTANEOUS at 08:28

## 2024-04-19 RX ADMIN — SODIUM CHLORIDE: 9 INJECTION, SOLUTION INTRAVENOUS at 18:38

## 2024-04-19 RX ADMIN — SODIUM CHLORIDE: 9 INJECTION, SOLUTION INTRAVENOUS at 15:11

## 2024-04-19 RX ADMIN — SODIUM CHLORIDE: 9 INJECTION, SOLUTION INTRAVENOUS at 07:18

## 2024-04-19 RX ADMIN — CYCLOBENZAPRINE HYDROCHLORIDE 5 MG: 5 TABLET, FILM COATED ORAL at 03:58

## 2024-04-19 RX ADMIN — METHOCARBAMOL TABLETS 500 MG: 500 TABLET, COATED ORAL at 06:29

## 2024-04-19 RX ADMIN — SODIUM CHLORIDE: 9 INJECTION, SOLUTION INTRAVENOUS at 04:04

## 2024-04-19 NOTE — PLAN OF CARE
Problem: Discharge Planning  Goal: Discharge to home or other facility with appropriate resources  4/19/2024 0950 by Lewis Rosario, RN  Outcome: Progressing     Problem: Pain  Goal: Verbalizes/displays adequate comfort level or baseline comfort level  4/19/2024 0950 by Lewis Rosario, RN  Outcome: Progressing     Problem: Musculoskeletal - Adult  Goal: Return mobility to safest level of function  4/19/2024 0950 by Lewis Rosario, RN  Outcome: Progressing     Problem: Genitourinary - Adult  Goal: Absence of urinary retention  4/19/2024 0950 by Lewis Rosario, RN  Outcome: Progressing

## 2024-04-19 NOTE — DISCHARGE INSTR - COC
***    Intake/Output Summary (Last 24 hours) at 2024 0849  Last data filed at 2024 0604  Gross per 24 hour   Intake 41062.31 ml   Output 09187 ml   Net 252.31 ml     I/O last 3 completed shifts:  In: 08556.6 [P.O.:45902; I.V.:6610.6]  Out: 91581 [Urine:07093]    Safety Concerns:     { HUMBERTO Safety Concerns:709297617}    Impairments/Disabilities:      { HUMBERTO Impairments/Disabilities:062436427}    Nutrition Therapy:  Current Nutrition Therapy:   { HUMBERTO Diet List:605362481}    Routes of Feeding: {[facility] DME Other Feedings:934930667}  Liquids: {Slp liquid thickness:29064}  Daily Fluid Restriction: {[facility] DME Yes amt example:270405534}  Last Modified Barium Swallow with Video (Video Swallowing Test): {Done Not Done Date:}    Treatments at the Time of Hospital Discharge:   Respiratory Treatments: ***  Oxygen Therapy:  {Therapy; copd oxygen:22086}  Ventilator:    { CC Vent List:073718758}    Rehab Therapies: {THERAPEUTIC INTERVENTION:5394245590}  Weight Bearing Status/Restrictions: {[facility] Weight Bearin}  Other Medical Equipment (for information only, NOT a DME order):  {EQUIPMENT:830468050}  Other Treatments: ***    Patient's personal belongings (please select all that are sent with patient):  {[facility] DME Belongings:992058330}    RN SIGNATURE:  {Esignature:245818102}    CASE MANAGEMENT/SOCIAL WORK SECTION    Inpatient Status Date: ***    Readmission Risk Assessment Score:  Readmission Risk              Risk of Unplanned Readmission:  5           Discharging to Facility/ Agency   Name:   Address:  Phone:  Fax:    Dialysis Facility (if applicable)   Name:  Address:  Dialysis Schedule:  Phone:  Fax:    / signature: {Esignature:168304694}    PHYSICIAN SECTION    Prognosis: {Prognosis:1809101709}    Condition at Discharge: { Patient Condition:613738316}    Rehab Potential (if transferring to Rehab): {Prognosis:5665238505}    Recommended Labs or Other Treatments After

## 2024-04-19 NOTE — PLAN OF CARE
Problem: Discharge Planning  Goal: Discharge to home or other facility with appropriate resources  Outcome: Progressing  Flowsheets (Taken 4/18/2024 2100)  Discharge to home or other facility with appropriate resources:   Identify barriers to discharge with patient and caregiver   Arrange for needed discharge resources and transportation as appropriate   Identify discharge learning needs (meds, wound care, etc)     Problem: Pain  Goal: Verbalizes/displays adequate comfort level or baseline comfort level  Outcome: Progressing     Problem: Musculoskeletal - Adult  Goal: Return mobility to safest level of function  Outcome: Progressing  Flowsheets (Taken 4/18/2024 2100)  Return Mobility to Safest Level of Function:   Assess patient stability and activity tolerance for standing, transferring and ambulating with or without assistive devices   Assist with transfers and ambulation using safe patient handling equipment as needed   Ensure adequate protection for wounds/incisions during mobilization   Obtain physical therapy/occupational therapy consults as needed   Instruct patient/family in ordered activity level     Problem: Genitourinary - Adult  Goal: Absence of urinary retention  Outcome: Progressing

## 2024-04-19 NOTE — CARE COORDINATION
[facility] Quality Flow/Interdisciplinary Rounds Progress Note    Quality Flow Rounds held on April 19, 2024 at 0930    Disciplines Attending:  Bedside Nurse, , , and Nursing Unit Leadership    Barriers to Discharge: clinical status    Anticipated Discharge Date:   4/20/24    Anticipated Discharge Disposition: Home    Readmission Risk              Risk of Unplanned Readmission:  5           Discussed patient goal for the day, patient clinical progression, and barriers to discharge.  IV fluids continued. Trending labs. Plan to return home independent at discharge.  Marely Stafford RN  April 19, 2024

## 2024-04-20 LAB
ALBUMIN SERPL-MCNC: 3.7 G/DL (ref 3.5–5.2)
ALBUMIN SERPL-MCNC: 4.2 G/DL (ref 3.5–5.2)
ALP SERPL-CCNC: 54 U/L (ref 40–129)
ALP SERPL-CCNC: 64 U/L (ref 40–129)
ALT SERPL-CCNC: 1000 U/L (ref 5–41)
ALT SERPL-CCNC: 1186 U/L (ref 5–41)
ANION GAP SERPL CALCULATED.3IONS-SCNC: 7 MMOL/L (ref 9–17)
AST SERPL-CCNC: 1864 U/L
AST SERPL-CCNC: 1965 U/L
BILIRUB DIRECT SERPL-MCNC: <0.1 MG/DL
BILIRUB INDIRECT SERPL-MCNC: ABNORMAL MG/DL (ref 0–1)
BILIRUB SERPL-MCNC: 0.3 MG/DL (ref 0.3–1.2)
BILIRUB SERPL-MCNC: 0.3 MG/DL (ref 0.3–1.2)
BUN SERPL-MCNC: 17 MG/DL (ref 6–20)
BUN/CREAT SERPL: 28 (ref 9–20)
CALCIUM SERPL-MCNC: 9.4 MG/DL (ref 8.6–10.4)
CHLORIDE SERPL-SCNC: 100 MMOL/L (ref 98–107)
CK SERPL-CCNC: ABNORMAL U/L (ref 39–308)
CK SERPL-CCNC: ABNORMAL U/L (ref 39–308)
CO2 SERPL-SCNC: 32 MMOL/L (ref 20–31)
CREAT SERPL-MCNC: 0.6 MG/DL (ref 0.7–1.2)
GFR SERPL CREATININE-BSD FRML MDRD: >90 ML/MIN/1.73M2
GLUCOSE SERPL-MCNC: 81 MG/DL (ref 70–99)
MYOGLOBIN SERPL-MCNC: 343 NG/ML (ref 28–72)
MYOGLOBIN SERPL-MCNC: 705 NG/ML (ref 28–72)
MYOGLOBIN SERPL-MCNC: 712 NG/ML (ref 28–72)
POTASSIUM SERPL-SCNC: 3.8 MMOL/L (ref 3.7–5.3)
PROT SERPL-MCNC: 6 G/DL (ref 6.4–8.3)
PROT SERPL-MCNC: 7.1 G/DL (ref 6.4–8.3)
SODIUM SERPL-SCNC: 139 MMOL/L (ref 135–144)

## 2024-04-20 PROCEDURE — 82550 ASSAY OF CK (CPK): CPT

## 2024-04-20 PROCEDURE — 2580000003 HC RX 258: Performed by: NURSE PRACTITIONER

## 2024-04-20 PROCEDURE — 83874 ASSAY OF MYOGLOBIN: CPT

## 2024-04-20 PROCEDURE — 99232 SBSQ HOSP IP/OBS MODERATE 35: CPT | Performed by: FAMILY MEDICINE

## 2024-04-20 PROCEDURE — 6360000002 HC RX W HCPCS

## 2024-04-20 PROCEDURE — 80076 HEPATIC FUNCTION PANEL: CPT

## 2024-04-20 PROCEDURE — 36415 COLL VENOUS BLD VENIPUNCTURE: CPT

## 2024-04-20 PROCEDURE — 2580000003 HC RX 258: Performed by: FAMILY MEDICINE

## 2024-04-20 PROCEDURE — 80053 COMPREHEN METABOLIC PANEL: CPT

## 2024-04-20 PROCEDURE — 1200000000 HC SEMI PRIVATE

## 2024-04-20 RX ADMIN — SODIUM CHLORIDE: 9 INJECTION, SOLUTION INTRAVENOUS at 23:56

## 2024-04-20 RX ADMIN — SODIUM CHLORIDE: 9 INJECTION, SOLUTION INTRAVENOUS at 10:17

## 2024-04-20 RX ADMIN — SODIUM CHLORIDE: 9 INJECTION, SOLUTION INTRAVENOUS at 06:19

## 2024-04-20 RX ADMIN — SODIUM CHLORIDE: 9 INJECTION, SOLUTION INTRAVENOUS at 02:15

## 2024-04-20 RX ADMIN — ENOXAPARIN SODIUM 40 MG: 100 INJECTION SUBCUTANEOUS at 10:18

## 2024-04-20 NOTE — PLAN OF CARE
Pt A+O x 4, independent. Strict I+O documented for the shift. Liver labs, CK, and myoglobin continue to improve. Pt having some complaints of muscle cramps overnight, denies wanting any pain medications. IV fluid continued to help with hydration. Pt is progressing towards discharge.    Problem: Discharge Planning  Goal: Discharge to home or other facility with appropriate resources  Outcome: Progressing  Flowsheets (Taken 4/19/2024 1935)  Discharge to home or other facility with appropriate resources:   Identify barriers to discharge with patient and caregiver   Arrange for needed discharge resources and transportation as appropriate   Identify discharge learning needs (meds, wound care, etc)     Problem: Pain  Goal: Verbalizes/displays adequate comfort level or baseline comfort level  Outcome: Progressing     Problem: Musculoskeletal - Adult  Goal: Return mobility to safest level of function  Outcome: Progressing  Flowsheets (Taken 4/19/2024 1935)  Return Mobility to Safest Level of Function:   Assess patient stability and activity tolerance for standing, transferring and ambulating with or without assistive devices   Assist with transfers and ambulation using safe patient handling equipment as needed   Ensure adequate protection for wounds/incisions during mobilization   Obtain physical therapy/occupational therapy consults as needed   Instruct patient/family in ordered activity level     Problem: Genitourinary - Adult  Goal: Absence of urinary retention  Outcome: Progressing  Flowsheets (Taken 4/19/2024 1935)  Absence of urinary retention:   Assess patient’s ability to void and empty bladder   Monitor intake/output and perform bladder scan as needed

## 2024-04-20 NOTE — PLAN OF CARE
Problem: Discharge Planning  Goal: Discharge to home or other facility with appropriate resources  Outcome: Progressing     Problem: Pain  Goal: Verbalizes/displays adequate comfort level or baseline comfort level  Outcome: Progressing     Problem: Musculoskeletal - Adult  Goal: Return mobility to safest level of function  Outcome: Progressing     Problem: Genitourinary - Adult  Goal: Absence of urinary retention  Outcome: Progressing

## 2024-04-21 LAB
ALBUMIN SERPL-MCNC: 3.6 G/DL (ref 3.5–5.2)
ALP SERPL-CCNC: 59 U/L (ref 40–129)
ALT SERPL-CCNC: 1014 U/L (ref 5–41)
ANION GAP SERPL CALCULATED.3IONS-SCNC: 7 MMOL/L (ref 9–17)
AST SERPL-CCNC: 1450 U/L
BILIRUB SERPL-MCNC: 0.2 MG/DL (ref 0.3–1.2)
BUN SERPL-MCNC: 19 MG/DL (ref 6–20)
BUN/CREAT SERPL: 27 (ref 9–20)
CALCIUM SERPL-MCNC: 8.8 MG/DL (ref 8.6–10.4)
CHLORIDE SERPL-SCNC: 105 MMOL/L (ref 98–107)
CK SERPL-CCNC: ABNORMAL U/L (ref 39–308)
CO2 SERPL-SCNC: 29 MMOL/L (ref 20–31)
CREAT SERPL-MCNC: 0.7 MG/DL (ref 0.7–1.2)
GFR SERPL CREATININE-BSD FRML MDRD: >90 ML/MIN/1.73M2
GLUCOSE SERPL-MCNC: 87 MG/DL (ref 70–99)
MYOGLOBIN SERPL-MCNC: 338 NG/ML (ref 28–72)
MYOGLOBIN SERPL-MCNC: 582 NG/ML (ref 28–72)
MYOGLOBIN SERPL-MCNC: 678 NG/ML (ref 28–72)
POTASSIUM SERPL-SCNC: 4 MMOL/L (ref 3.7–5.3)
PROT SERPL-MCNC: 6.2 G/DL (ref 6.4–8.3)
SODIUM SERPL-SCNC: 141 MMOL/L (ref 135–144)

## 2024-04-21 PROCEDURE — 1200000000 HC SEMI PRIVATE

## 2024-04-21 PROCEDURE — 82550 ASSAY OF CK (CPK): CPT

## 2024-04-21 PROCEDURE — 99232 SBSQ HOSP IP/OBS MODERATE 35: CPT | Performed by: FAMILY MEDICINE

## 2024-04-21 PROCEDURE — 36415 COLL VENOUS BLD VENIPUNCTURE: CPT

## 2024-04-21 PROCEDURE — 83874 ASSAY OF MYOGLOBIN: CPT

## 2024-04-21 PROCEDURE — 2580000003 HC RX 258: Performed by: FAMILY MEDICINE

## 2024-04-21 PROCEDURE — 80053 COMPREHEN METABOLIC PANEL: CPT

## 2024-04-21 RX ADMIN — SODIUM CHLORIDE: 9 INJECTION, SOLUTION INTRAVENOUS at 22:19

## 2024-04-21 RX ADMIN — SODIUM CHLORIDE: 9 INJECTION, SOLUTION INTRAVENOUS at 16:31

## 2024-04-21 RX ADMIN — SODIUM CHLORIDE: 9 INJECTION, SOLUTION INTRAVENOUS at 05:52

## 2024-04-21 NOTE — PLAN OF CARE
Problem: Discharge Planning  Goal: Discharge to home or other facility with appropriate resources  4/21/2024 1113 by Lewis Rosario, RN  Outcome: Progressing     Problem: Pain  Goal: Verbalizes/displays adequate comfort level or baseline comfort level  4/21/2024 1113 by Lewis Rosario, RN  Outcome: Progressing     Problem: Musculoskeletal - Adult  Goal: Return mobility to safest level of function  4/21/2024 1113 by Lewis Rosario, RN  Outcome: Progressing     Problem: Genitourinary - Adult  Goal: Absence of urinary retention  4/21/2024 1113 by Lewis Rosario, RN  Outcome: Progressing

## 2024-04-21 NOTE — PLAN OF CARE
Problem: Discharge Planning  Goal: Discharge to home or other facility with appropriate resources  Outcome: Progressing  Flowsheets (Taken 4/20/2024 2328)  Discharge to home or other facility with appropriate resources:   Identify barriers to discharge with patient and caregiver   Arrange for needed discharge resources and transportation as appropriate   Identify discharge learning needs (meds, wound care, etc)   Arrange for interpreters to assist at discharge as needed   Refer to discharge planning if patient needs post-hospital services based on physician order or complex needs related to functional status, cognitive ability or social support system     Problem: Pain  Goal: Verbalizes/displays adequate comfort level or baseline comfort level  Outcome: Progressing     Problem: Musculoskeletal - Adult  Goal: Return mobility to safest level of function  Outcome: Progressing  Flowsheets (Taken 4/20/2024 2328)  Return Mobility to Safest Level of Function:   Assess patient stability and activity tolerance for standing, transferring and ambulating with or without assistive devices   Assist with transfers and ambulation using safe patient handling equipment as needed   Ensure adequate protection for wounds/incisions during mobilization   Obtain physical therapy/occupational therapy consults as needed   Instruct patient/family in ordered activity level     Problem: Genitourinary - Adult  Goal: Absence of urinary retention  Outcome: Progressing  Flowsheets (Taken 4/20/2024 2328)  Absence of urinary retention:   Assess patient’s ability to void and empty bladder   Monitor intake/output and perform bladder scan as needed

## 2024-04-22 VITALS
WEIGHT: 171 LBS | DIASTOLIC BLOOD PRESSURE: 61 MMHG | OXYGEN SATURATION: 98 % | HEIGHT: 68 IN | SYSTOLIC BLOOD PRESSURE: 117 MMHG | TEMPERATURE: 97.5 F | BODY MASS INDEX: 25.91 KG/M2 | RESPIRATION RATE: 16 BRPM | HEART RATE: 50 BPM

## 2024-04-22 LAB
ALBUMIN SERPL-MCNC: 4 G/DL (ref 3.5–5.2)
ALP SERPL-CCNC: 58 U/L (ref 40–129)
ALT SERPL-CCNC: 1087 U/L (ref 5–41)
AST SERPL-CCNC: 1233 U/L
BILIRUB DIRECT SERPL-MCNC: <0.1 MG/DL
BILIRUB INDIRECT SERPL-MCNC: ABNORMAL MG/DL (ref 0–1)
BILIRUB SERPL-MCNC: 0.2 MG/DL (ref 0.3–1.2)
CK SERPL-CCNC: ABNORMAL U/L (ref 39–308)
MYOGLOBIN SERPL-MCNC: 393 NG/ML (ref 28–72)
PROT SERPL-MCNC: 6.3 G/DL (ref 6.4–8.3)

## 2024-04-22 PROCEDURE — 36415 COLL VENOUS BLD VENIPUNCTURE: CPT

## 2024-04-22 PROCEDURE — 83874 ASSAY OF MYOGLOBIN: CPT

## 2024-04-22 PROCEDURE — 82550 ASSAY OF CK (CPK): CPT

## 2024-04-22 PROCEDURE — 2580000003 HC RX 258: Performed by: FAMILY MEDICINE

## 2024-04-22 PROCEDURE — 80076 HEPATIC FUNCTION PANEL: CPT

## 2024-04-22 PROCEDURE — 99238 HOSP IP/OBS DSCHRG MGMT 30/<: CPT | Performed by: FAMILY MEDICINE

## 2024-04-22 RX ADMIN — SODIUM CHLORIDE: 9 INJECTION, SOLUTION INTRAVENOUS at 08:16

## 2024-04-22 RX ADMIN — SODIUM CHLORIDE: 9 INJECTION, SOLUTION INTRAVENOUS at 03:26

## 2024-04-22 ASSESSMENT — ENCOUNTER SYMPTOMS
COUGH: 0
CHOKING: 0
SHORTNESS OF BREATH: 0
DIARRHEA: 0
WHEEZING: 0
NAUSEA: 0
CHEST TIGHTNESS: 0
VOMITING: 0
CONSTIPATION: 0
RHINORRHEA: 0
VOICE CHANGE: 0
ABDOMINAL PAIN: 0
SINUS PRESSURE: 0
BACK PAIN: 0

## 2024-04-22 NOTE — DISCHARGE SUMMARY
and numbness followed by hematuria.  Evaluation emergency room showed CK of 41,760.  Patient was diagnosed with rhabdomyolysis and admitted for further treatment.  CK peaked at 60,112.  Patient was treated with IV fluids and aggressive oral fluid resuscitation, conservative management.  He showed gradual increase in muscle enzymes and CK reduced to 15,001 and 21.  Patient also had transaminitis.  Acute hepatitis panel was negative.  Ultrasound gallbladder and liver did not show any structural changes.       Significant therapeutic interventions:   Acute Rhabdomyolysis   Treated with conservative management, aggressive IV fluid resuscitation.  Transaminitis   Secondary to acute rhabdomyolysis.  Negative acute hepatitis panel, liver ultrasound.       Significant Diagnostic Studies:   Labs / Micro:/Radiology  Recent Labs     04/19/24  0158 04/18/24  0218 04/17/24  0600   WBC 6.7 6.9 7.0   HGB 11.7* 12.5* 12.5*   HCT 37.9* 40.7 40.0*   MCV 90.2 91.7 89.5    190 194         Latest Ref Rng & Units 4/21/2024     4:12 AM 4/20/2024     8:20 PM 4/19/2024     1:58 AM 4/18/2024     2:18 AM 4/17/2024     6:00 AM   Labs Renal   BUN 6 - 20 mg/dL 19  17  16  14  17    Cr 0.7 - 1.2 mg/dL 0.7  0.6  0.7  0.7  0.7    K 3.7 - 5.3 mmol/L 4.0  3.8  3.7  4.0  4.3    Na 135 - 144 mmol/L 141  139  139  137  141      Lab Results   Component Value Date    ALT 1,087 (H) 04/22/2024    AST 1,233 (H) 04/22/2024    ALKPHOS 58 04/22/2024    BILITOT 0.2 (L) 04/22/2024     No results found for: \"TSHFT4\", \"TSH\"  Lab Results   Component Value Date    HEPAIGM NONREACTIVE 04/17/2024    HEPBIGM NONREACTIVE 04/17/2024    HEPCAB NONREACTIVE 04/17/2024     Lab Results   Component Value Date/Time    COLORU ADITYA 04/16/2024 02:07 PM    NITRU NEGATIVE 04/16/2024 02:07 PM    GLUCOSEU NEGATIVE 04/16/2024 02:07 PM    KETUA NEGATIVE 04/16/2024 02:07 PM    UROBILINOGEN Normal 04/16/2024 02:07 PM    BILIRUBINUR NEGATIVE 04/16/2024 02:07 PM     No results found

## 2024-04-22 NOTE — PLAN OF CARE
Pt independent, alert x oriented x4, pt updated with CK lab results.    Problem: Discharge Planning  Goal: Discharge to home or other facility with appropriate resources  Outcome: Progressing  Flowsheets (Taken 4/21/2024 2040)  Discharge to home or other facility with appropriate resources:   Identify barriers to discharge with patient and caregiver   Arrange for needed discharge resources and transportation as appropriate   Identify discharge learning needs (meds, wound care, etc)   Arrange for interpreters to assist at discharge as needed   Refer to discharge planning if patient needs post-hospital services based on physician order or complex needs related to functional status, cognitive ability or social support system     Problem: Pain  Goal: Verbalizes/displays adequate comfort level or baseline comfort level  Outcome: Progressing     Problem: Musculoskeletal - Adult  Goal: Return mobility to safest level of function  Outcome: Progressing  Flowsheets (Taken 4/21/2024 2040)  Return Mobility to Safest Level of Function:   Assess patient stability and activity tolerance for standing, transferring and ambulating with or without assistive devices   Assist with transfers and ambulation using safe patient handling equipment as needed   Obtain physical therapy/occupational therapy consults as needed   Instruct patient/family in ordered activity level     Problem: Genitourinary - Adult  Goal: Absence of urinary retention  Outcome: Progressing  Flowsheets (Taken 4/21/2024 2040)  Absence of urinary retention:   Assess patient’s ability to void and empty bladder   Monitor intake/output and perform bladder scan as needed

## 2024-04-22 NOTE — PROGRESS NOTES
GASTROENTEROLOGY NOTE       Patient:   Benjamin Truong   :    1995   Facility:   [facility]  Date:     2024  Consultant:   Manpreet Juarez MD, DO      SUBJECTIVE:    28 y.o. male admitted 2024 with Non-traumatic rhabdomyolysis [M62.82]  Rhabdomyolysis [M62.82].      He denies any GI symptoms.    No abdominal pain, N/V and tolerating diet well.  No BM yet.  Does not feel constipated.          OBJECTIVE:   Vital Signs:  /84   Pulse 70   Temp 97.7 °F (36.5 °C) (Oral)   Resp 18   Ht 1.727 m (5' 8\")   Wt 72 kg (158 lb 12.8 oz)   SpO2 100%   BMI 24.15 kg/m²      Physical Exam:   General appearance: Alert, NAD  Lungs: CTA bilaterally, unlabored pattern  Heart: S1S2, RRR without murmur, clicks, gallops.  Abdomen: Soft, NT, ND +BS, no masses  Skin/Musculoskeletal:  No jaundice. No clubbing, cyanosis, or edema.  ROM normal.      Lab and Imaging Review   Recent Labs     24  1415 24  0600 24  0218 24  0802   WBC 9.2 7.0 6.9  --    HGB 13.9 12.5* 12.5*  --    MCV 83.9 89.5 91.7  --     194 190  --    INR 0.9  --   --   --     141 137  --    K 4.3 4.3 4.0  --     107 103  --    CO2 26 28 30  --    BUN 25* 17 14  --    CREATININE 0.7 0.7 0.7  --    GLUCOSE 123* 105* 87  --    CALCIUM 9.6 8.3* 8.6  --    PROT 7.4 5.8* 6.0*  --    AST 1,916* 2,749* 2,605*  --    * 847* 977*  --    ALKPHOS 73 62 58  --    BILITOT 0.3 0.1* 0.2*  --    BILIDIR  --   --  0.1  --    LIPASE  --  38  --   --    MG  --   --   --  1.9     Recent Labs     24  1415   INR 0.9   PROTIME 9.2*         Impression:    Rhabdomyolysis  Markedly elevated transaminases secondary to #1.  History of recurrent rhabdomyolysis.  Viral hepatitis markers negative.  Liver ultrasound without significant pathology.      PLAN:    Nothing further to add from a gastro intestinal perspective.  Underlying treatment for rhabdomyolysis.  Follow-up with primary care physician in the office is 
[facility]  Office: [phone]  Harish Palmer DO, Jostin Crespo DO, Salvador Prescott DO, Ricardo Vasquez, DO, Emi Reese MD, Susana Moreira MD, Ian Scott MD, Marie Verdugo MD,  Sylvain Cronin MD, Hawa Rizo MD, Elbert Thomas MD,  Brigid Tarango DO, Rachna Haro MD, Yash Power MD, Levar Palmer DO, Purvi Monsalve MD,  Eric Villarreal DO, Romana Charles MD, Keara Hall MD, Ana Paula Whittington MD, Ankita Leung MD,  Gerardo Maciel MD, Jessa Witt MD, Eric Troncoso MD, Warren Hopson MD, Doug Garrison MD, Joycelyn Cyr MD, Mark Martinez DO, Sony Garcia DO, Petra Jensen MD,  Jesus Lane MD, Shirley Waterhouse, CNP,  Mariana Arias CNP, Shaquille Marin, CNP,  Jessenia Calderon, DNP, Salma Dueñas, CNP, Bharti Russell, CNP, Stacie Coleman, CNP, Lizbeth Elizabeth, CNP, Renetta Cronejo, PA-C, Rafaela Trevino PA-C, Saray Mon, CNP, Janessa Daigle, CNP, Mercedes Meyer, CNP, Carmen Gutierrez, CNP, Carrie Dent, CNP, Ashly Pelaez, CNS, Danielle Stewart, CNP, Roxana Cabrera CNP, Tracy Schwab, CNP       [facility]      Daily Progress Note     Admit Date: 4/16/2024  Bed/Room No.  2016/2016-02  Admitting Physician : Ian Scott MD  Code Status :Full Code  Hospital Day:  LOS: 6 days   Chief Complaint:     Chief Complaint   Patient presents with    Hematuria     Principal Problem:    Rhabdomyolysis  Active Problems:    Transaminitis  Resolved Problems:    * No resolved hospital problems. *    Subjective :        Interval History/Significant events :  04/22/24    Patient is sitting comfortably in chair and is playing with Legos.  He has no new complaints for me.  Patient has adequate urine output.  Creatinine kinase is slowly improving.  He denies any muscle fatigue, pain, swelling or weakness.  Vitals - Stable afebrile  Labs -CK 60,000 112 ---> 15,001 and 21  AST 2749 ---> 1233  ALT 1106 ---> 1087  Bilirubin <0.1      Nursing notes , Consults notes reviewed. 
Occupational Therapy  DATE: 2024    NAME: Benjamin Truong  MRN: [id number]   : 1995    Patient not seen this date for Occupational Therapy due to:      [] Cancel by RN or physician due to:    [] Hemodialysis    [] Critical Lab Value Level     [] Blood transfusion in progress    [] Acute or unstable cardiovascular status   _MAP < 55 or more than >115  _HR < 40 or > 130    [] Acute or unstable pulmonary status   -FiO2 > 60%   _RR < 5 or >40    _O2 sats < 85%    [] Strict Bedrest    [] Off Unit for surgery or procedure    [] Off Unit for testing       [] Pending imaging to R/O fracture    [] Refusal by Patient      [x] Other- cx per RN/lead RN as pt is I and has no therapy needs; DC OT Order       [] PT being discontinued at this time. Patient independent. No further needs.     [] PT being discontinued at this time as the patient has been transferred to hospice care. No further needs.      Clementina Dickens, OT   
[facility]  Office: [phone]  Harish Palmer DO, Jostin Crespo DO, Salvador Prescott DO, Ricardo Vasquez DO, Emi Reese MD, Susana Moreira MD, Ian Scott MD, Marie Verdugo MD,  Sylvain Cronin MD, Hawa Rizo MD, Elbert Thomas MD,  Brigid Tarango DO, Rachna Haro MD, Yash Power MD, Levar Palmer DO, Purvi Monsalve MD,  Eric Villarreal DO, Romana Charles MD, Keara Hall MD, Ana Paula Whittington MD, Ankita Leung MD,  Gerardo Maciel MD, Jessa Witt MD, Eric Troncoso MD, Warren Hopson MD, Doug Garrison MD, Joycelyn Cyr MD, Mark Martinez DO, Sony Garcia DO, Petra Jensen MD,  Jesus Lane MD, Shirley Waterhouse, CNP,  Mariana Arias CNP, Shaquille Marin, CNP,  Jessenia Calderon, DNP, Salma Dueñas, CNP, Bharti Russell, CNP, Stacie Coleman CNP, Lizbeth Elizabeth, CNP, Renetta Cornejo, PA-C, Rafaela Trevino PA-C, Saray Mon, CNP, Janessa Daigle, CNP, Mercedes Meyer, CNP, Carmen Gutierrez, CNP, Carrie Dent CNP, Ashly Pelaez, CNS, Danielle Stewart, CNP, Roxana Cabrera CNP, Tracy Schwab, CNP         [facility]   IN-PATIENT SERVICE   [facility]    Progress Note    4/17/2024    9:21 AM    Name:   Benjamin Truong  MRN:     [id number]     Acct:      [id number]   Room:   2016/2016-02  IP Day:  1  Admit Date:  4/16/2024  1:55 PM    PCP:   No primary care provider on file.  Code Status:  Full Code    Subjective:     C/C:   Chief Complaint   Patient presents with    Hematuria     Interval History Status: he feels better but his labs are worse.     AST and ALT remain elevated   CK trending up  Consult GI  Give additional fluid bolus   Acute Hepatitis level pending   Stop prn tylenol  He reports his urine is clear now     Brief History:     Per previous documentation  \"Benjamin Truong is a 28 y.o. Non- / non  male who presents with Hematuria   and is admitted to the hospital for the management of Rhabdomyolysis.     28-year-old with a past medical 
Physical Therapy  DATE: 2024    NAME: Benjamin Truong  MRN: [id number]   : 1995    Patient not seen this date for Physical Therapy due to:     [x] PT being discontinued at this time.  Pt is an ULTRAMARATHONER, INDEP and has NO PT needs       BRENDA SHEPPARD PT    
Pt admitted to room from [place] [facility]  Oriented to room and call light/tv controls.  Bed in lowest position, wheels locked, 2/4 side rails up  Call light in reach, room free of clutter, adequate lighting provided.   
[facility]  Office: [phone]  Harish Palmer DO, Jostin Crespo DO, Salvador Prescott DO, Ricardo Vasquez DO, Emi Reese MD, Susana Moreira MD, Ian Scott MD, Marie Verdugo MD,  Sylvain Cronin MD, Hawa Rizo MD, Elbert Thomas MD,  Brigid Tarango DO, Rachna Haro MD, Yash Power MD, Levar Palmer DO, Purvi Monsalve MD,  Eric Villarreal DO, Romana Charles MD, Keara Hall MD, Ana Paula Whittington MD, Ankita Leung MD,  Gerardo Maciel MD, Jessa Witt MD, Eric Troncoso MD, Warren Hopson MD, Doug Garrison MD, Joycelyn Cyr MD, Mark Martinez DO, Sony Garcia DO, Petra Jensen MD,  Jesus Lane MD, Shirley Waterhouse, CNP,  Mariana Arias CNP, Shaquille Marin, CNP,  Jessenia Calderon, DNP, Salma Dueñas, CNP, Bharti Russell, CNP, Stacie Coleman CNP, Lizbeth Elizabeth, CNP, Renetta Cornejo, PA-C, Rafaela Trevino PA-C, Saray Mon, CNP, Janessa Daigle, CNP, Mercedes Meyer, CNP, Carmen Gutierrez, CNP, Carrie Dent, CNP, Ashly Pelaez, CNS, Danielle Stewart, CNP, Roxana Cabrera CNP, Tracy Schwab, CNP         [facility]   IN-PATIENT SERVICE   [facility]    Progress Note    4/19/2024    7:48 AM    Name:   Benjamin Truong  MRN:     [id number]     Acct:      [id number]   Room:   2016/2016-02  IP Day:  3  Admit Date:  4/16/2024  1:55 PM    PCP:   No primary care provider on file.  Code Status:  Full Code    Subjective:     C/C:   Chief Complaint   Patient presents with    Hematuria     Interval History Status: he feels better but his labs are worse.     CK and myoglobin remain elevated but are not increasing   AST and ALT remain elevated. ALT is slightly higher but AST is trending down   Kidney function remains stable   UO 17,500 in the last 24 hrs     He continues to complain of some intermittent muscle cramping to the right thigh area but otherwise he feels fine.      Brief History:     Per previous documentation  \"Benjamin Truong is a 28 y.o. Non- / non  
The pt was discharged to home.  The discharge instructions were given and reviewed with the pt. He was escorted to the exit in stable condition   
[facility] Pharmacy Service   Medication Review    The patient's list of current home medications has been reviewed.     Source(s) of information: spoke to patient     Based on information provided by the above source(s), I have updated the patient's home med list as described below.       I changed or updated the following medications on the patient's home medication list:  Discontinued None      Added MELATONIN PO  diclofenac sodium (VOLTAREN) 1 % GEL  Magnesium 400 MG TABS     Adjusted   None      Other Notes None            Please feel free to call me with any questions about this encounter. Thank you.    This note will be reviewed and co-signed by the [facility] Pharmacist. The pharmacist will review inpatient orders and contact the physician about any discrepancies.    Gino Crane, pharmacy technician  [facility]  Phone:  [phone]  Fax: [phone]      Electronically signed by Gino Crane on 4/18/2024 at 5:32 PM       Prior to Admission medications    Medication Sig   Magnesium 400 MG TABS Take 1 tablet by mouth in the morning and at bedtime     diclofenac sodium (VOLTAREN) 1 % GEL Apply 2 g topically as needed for Pain   MELATONIN PO Take 1 tablet by mouth nightly     Naproxen Sodium (ALEVE) 220 MG CAPS Take 220 mg by mouth as needed for Pain         
Writer had lab rerun his abnormally low CK results from Friday and Saturday at noon. Both results were false and the new, accurate results were charted by Kaz in the lab.   
  WBC 6.9 6.7   HGB 12.5* 11.7*   HCT 40.7 37.9*    179     Recent Labs     04/18/24  0218 04/18/24  0802 04/19/24  0158     --  139   K 4.0  --  3.7     --  103   CO2 30  --  30   GLUCOSE 87  --  85   BUN 14  --  16   CREATININE 0.7  --  0.7   MG  --  1.9  --    CALCIUM 8.6  --  8.7     Recent Labs     04/18/24  0218 04/18/24  0802 04/19/24  0158 04/19/24  1158 04/19/24  1934 04/20/24  0328   PROT 6.0*  --  5.9*  --   --  6.0*   AST 2,605*  --  2,346*  --   --  1,965*   *  --  1,003*  --   --  1,000*   ALKPHOS 58  --  54  --   --  54   BILITOT 0.2*  --  0.2*  --   --  0.3   BILIDIR 0.1  --  <0.1  --   --  <0.1   CKTOTAL 50,770*   < > 43,020* 1,119* 37,070* 29,744*    < > = values in this interval not displayed.          Specific Gravity, UA   Date Value Ref Range Status   04/16/2024 1.020 1.005 - 1.030 Final     Protein, UA   Date Value Ref Range Status   04/16/2024 2+ (A) NEGATIVE mg/dL Final     RBC, UA   Date Value Ref Range Status   04/16/2024 0 TO 2 0 - 2 /HPF Final     Bacteria, UA   Date Value Ref Range Status   04/16/2024 None None Final     Nitrite, Urine   Date Value Ref Range Status   04/16/2024 NEGATIVE NEGATIVE Final     WBC, UA   Date Value Ref Range Status   04/16/2024 None 0 - 5 /HPF Final     Leukocyte Esterase, Urine   Date Value Ref Range Status   04/16/2024 NEGATIVE NEGATIVE Final       Imaging / Clinical Data :-   US ABDOMEN LIMITED Specify organ? LIVER, SPLEEN   Final Result   No cholelithiasis or cholecystitis.  There is trace nonspecific   pericholecystic fluid      No splenomegaly              Clinical Course : stable  Assessment and Plan  :        Acute Rhabdomyolysis   Decrease IV fluids.  Continue to monitor CMP and CK.  Transaminitis   Secondary to acute rhabdomyolysis.  Continue to monitor CMP.    Encourage oral hydration.  Recommend outpatient referral and follow-up to rheumatologist.    Continue to monitor vitals , Intake / output ,  Cell count , HGB , 
\"HCO3\", \"NBEA\", \"PBEA\", \"BEART\", \"BE\", \"THGBART\", \"THB\", \"KHQ1BSF\", \"HPKV6IRJ\", \"I2YUXYNL\", \"O2SAT\", \"FIO2\"  No results found for: \"SPECIAL\"  No results found for: \"CULTURE\"    Radiology:  No results found.    Physical Examination:        Physical Exam  Vitals and nursing note reviewed.   HENT:      Mouth/Throat:      Mouth: Mucous membranes are moist.   Eyes:      Conjunctiva/sclera: Conjunctivae normal.   Cardiovascular:      Rate and Rhythm: Normal rate and regular rhythm.      Pulses: Normal pulses.      Heart sounds: Normal heart sounds.   Pulmonary:      Effort: Pulmonary effort is normal.      Breath sounds: Normal breath sounds.   Abdominal:      General: Bowel sounds are normal.      Palpations: Abdomen is soft.   Musculoskeletal:         General: Tenderness (Intermittent cramping to his gluteus muscles bilaterally into the right thigh improves after rest and Flexeril) present. Normal range of motion.      Right lower leg: No edema.      Left lower leg: No edema.   Skin:     General: Skin is warm and dry.      Capillary Refill: Capillary refill takes less than 2 seconds.   Neurological:      Mental Status: He is oriented to person, place, and time.   Psychiatric:         Mood and Affect: Mood normal.         Assessment:        Hospital Problems             Last Modified POA    * (Principal) Rhabdomyolysis 4/16/2024 Yes    Transaminitis 4/17/2024 Yes     Plan:        Rhabdomyolysis-increase saline back up to 250 mls/hr Trend CK/myoglobin. Avoid nephrotoxic agents. Strict I&O needed and discussed with RN and patient   Transaminitis-likely secondary to 1.  Continue IV fluids. Trend CMP daily.  GI consulted. Acute hepatitis panel negative, kristin negative    PATRICIA Melendez - CNP  4/18/2024  7:17 AM    
rheumatologist.    Continue to monitor vitals , Intake / output ,  Cell count , HGB , Kidney function, oxygenation  as indicated .   Plan and updates discussed with patient ,  answers  explained to satisfaction.   Plan discussed with Staff Lewis BEAN     (Please note that portions of this note were completed with a voice recognition program. Efforts were made to edit the dictations but occasionally words are mis-transcribed.)      Ian Scott MD  4/21/2024

## 2024-04-22 NOTE — PLAN OF CARE
Problem: Discharge Planning  Goal: Discharge to home or other facility with appropriate resources  4/22/2024 1317 by Rajani Coles RN  Outcome: Completed  4/22/2024 0527 by Alison Lara  Outcome: Progressing  Flowsheets (Taken 4/21/2024 2040)  Discharge to home or other facility with appropriate resources:   Identify barriers to discharge with patient and caregiver   Arrange for needed discharge resources and transportation as appropriate   Identify discharge learning needs (meds, wound care, etc)   Arrange for interpreters to assist at discharge as needed   Refer to discharge planning if patient needs post-hospital services based on physician order or complex needs related to functional status, cognitive ability or social support system     Problem: Pain  Goal: Verbalizes/displays adequate comfort level or baseline comfort level  4/22/2024 1317 by Rajani Coles RN  Outcome: Completed  4/22/2024 0527 by Alison Lara  Outcome: Progressing     Problem: Musculoskeletal - Adult  Goal: Return mobility to safest level of function  4/22/2024 1317 by Rajani Coles RN  Outcome: Completed  4/22/2024 0527 by Alison Lara  Outcome: Progressing  Flowsheets (Taken 4/21/2024 2040)  Return Mobility to Safest Level of Function:   Assess patient stability and activity tolerance for standing, transferring and ambulating with or without assistive devices   Assist with transfers and ambulation using safe patient handling equipment as needed   Obtain physical therapy/occupational therapy consults as needed   Instruct patient/family in ordered activity level     Problem: Genitourinary - Adult  Goal: Absence of urinary retention  4/22/2024 1317 by Rajani Coles RN  Outcome: Completed  4/22/2024 0527 by Alison Lara  Outcome: Progressing  Flowsheets (Taken 4/21/2024 2040)  Absence of urinary retention:   Assess patient’s ability to void and empty bladder   Monitor intake/output and perform bladder scan as needed

## 2024-04-23 ENCOUNTER — PATIENT OUTREACH (OUTPATIENT)
Dept: CARE COORDINATION | Facility: CLINIC | Age: 29
End: 2024-04-23

## 2024-04-23 NOTE — PROGRESS NOTES
[facility] initial follow up:  Call placed, voice mailbox full, unable to leave message.  Did assign conversa chat bot

## 2024-05-22 ENCOUNTER — PATIENT OUTREACH (OUTPATIENT)
Dept: CARE COORDINATION | Facility: CLINIC | Age: 29
End: 2024-05-22
Payer: COMMERCIAL

## 2024-05-22 NOTE — PROGRESS NOTES
[facility] follow up:  Jeff reports that he doing fine, no longer on the EHP, moved to [place] and is starting a new job.  Understands the importance of securing health insurance.  Will close the [facility] program

## 2025-07-08 ENCOUNTER — APPOINTMENT (OUTPATIENT)
Dept: GENERAL RADIOLOGY | Facility: CLINIC | Age: 30
End: 2025-07-08
Payer: COMMERCIAL

## 2025-07-08 ENCOUNTER — HOSPITAL ENCOUNTER (EMERGENCY)
Facility: CLINIC | Age: 30
Discharge: HOME | End: 2025-07-08
Attending: STUDENT IN AN ORGANIZED HEALTH CARE EDUCATION/TRAINING PROGRAM
Payer: COMMERCIAL

## 2025-07-08 VITALS
WEIGHT: 155 LBS | TEMPERATURE: 97.8 F | OXYGEN SATURATION: 98 % | HEIGHT: 67 IN | HEART RATE: 69 BPM | BODY MASS INDEX: 24.33 KG/M2 | SYSTOLIC BLOOD PRESSURE: 141 MMHG | RESPIRATION RATE: 17 BRPM | DIASTOLIC BLOOD PRESSURE: 99 MMHG

## 2025-07-08 DIAGNOSIS — M79.604 RIGHT LEG PAIN: Primary | ICD-10-CM

## 2025-07-08 PROCEDURE — 73590 X-RAY EXAM OF LOWER LEG: CPT

## 2025-07-08 PROCEDURE — 99283 EMERGENCY DEPT VISIT LOW MDM: CPT

## 2025-07-08 ASSESSMENT — PAIN - FUNCTIONAL ASSESSMENT: PAIN_FUNCTIONAL_ASSESSMENT: 0-10

## 2025-07-08 ASSESSMENT — PAIN DESCRIPTION - ORIENTATION: ORIENTATION: RIGHT

## 2025-07-08 ASSESSMENT — PAIN SCALES - GENERAL: PAINLEVEL_OUTOF10: 4

## 2025-07-08 ASSESSMENT — PAIN DESCRIPTION - DESCRIPTORS: DESCRIPTORS: SHARP;SHOOTING

## 2025-07-08 ASSESSMENT — PAIN DESCRIPTION - LOCATION: LOCATION: LEG

## 2025-07-09 NOTE — ED NOTES
Pt presents to ED ambulatory a&O x4. Pt states on Sunday he was running and went off the path and injured/twisted R ankle. Pt states the pain he is having is more towards above the ankle area. Minor swelling noted. No redness or bruising. Pt had ace wrap on. Pulses and sensation intact.

## 2025-07-09 NOTE — ED PROVIDER NOTES
[facility]  EMERGENCY DEPARTMENT ENCOUNTER  INDEPENDENT ATTESTATION         1. Right leg pain          Pt Name: Benjamin Truong  MRN: [id number]  Birthdate 1995  Date of evaluation: 7/8/25    Benjamin Truong is a 29 y.o. male who presents with Leg Injury (R leg) and Ankle Injury    ED Course as of 07/08/25 2352   Tue Jul 08, 2025   2352 XR TIBIA FIBULA RIGHT (2 VIEWS)  FINDINGS:  No acute fracture or dislocation.  No focal soft tissue swelling.     IMPRESSION:  No acute osseous abnormality identified.   [MA]   2352 Final read visualized by myself.  No changes to discharge plan. [MA]      ED Course User Index  [MA] Niharika Winters DO         Based on the medical record, the care appears appropriate. I was personally available for consultation in the Emergency Department.      FINAL IMPRESSION      1. Right leg pain          DISPOSITION / PLAN     DISPOSITION Decision To Discharge 07/08/2025 09:41:04 PM   DISPOSITION CONDITION Stable           PATIENT REFERRED TO:  [facility] Emergency Department  [address] [facility] [address]  [phone]    If symptoms worsen    Maxwell Case DO  [address] [facility] [id number] [phone]    Schedule an appointment as soon as possible for a visit in 2 days      Jared Bob MD  [address] [place] [address] [address] [place] [address] [phone]    Schedule an appointment as soon as possible for a visit in 2 days        DISCHARGE MEDICATIONS:  There are no discharge medications for this patient.      Dr. Niharika Winters DO   Attending Emergency Physician        Niharika Winters DO  07/08/25 2147       Niharika Winters DO  07/08/25 2352

## 2025-07-09 NOTE — ED PROVIDER NOTES
[facility]  eMERGENCY dEPARTMENT eNCOUnter      Pt Name: Benjamin Truong  MRN: [id number]  Birthdate 1995  Date of evaluation: 7/8/25      CHIEF COMPLAINT       Chief Complaint   Patient presents with    Leg Injury     R leg    Ankle Injury         HISTORY OF PRESENT ILLNESS    Benjamin Truong is a 29 y.o. male who presents complaining of right lower leg pain states he was running on Sunday when he kind of went off the trailer landed on his heel in an awkward position.  He states he ran and then a couple more miles and sat down and when he arose from sitting down he had pain in the right medial distal tib-fib.  He presents for evaluation.  The history is provided by the patient.   Leg Injury  Location:  Leg  Time since incident:  3 days  Injury: yes (Was running landed with heel first had pain to the right tibia area.  He presents for evaluation.)    Leg location:  R lower leg  Pain details:     Quality:  Aching    Radiates to:  Does not radiate    Severity:  Mild    Onset quality:  Sudden    Duration:  3 days    Timing:  Intermittent    Progression:  Waxing and waning  Chronicity:  New  Dislocation: no    Foreign body present:  No foreign bodies  Tetanus status:  Unknown  Prior injury to area:  Unable to specify  Relieved by:  Rest  Worsened by:  Bearing weight  Ineffective treatments:  None tried  Associated symptoms: swelling    Associated symptoms: no numbness        REVIEW OF SYSTEMS       Review of Systems   Musculoskeletal:  Positive for arthralgias.   All other systems reviewed and are negative.      PAST MEDICAL HISTORY     Past Medical History:   Diagnosis Date    Rhabdomyolysis        SURGICAL HISTORY     History reviewed. No pertinent surgical history.    CURRENT MEDICATIONS       Previous Medications    No medications on file       ALLERGIES     has no known allergies.    FAMILY HISTORY     has no family status information on file.        SOCIAL HISTORY      reports that he has

## 2025-07-09 NOTE — DISCHARGE INSTRUCTIONS
Ice and elevate affected extremity  Tylenol or Motrin for pain  Crutches for ambulation if painful to bear weight  Follow-up with orthopedics